# Patient Record
Sex: FEMALE | Race: WHITE | Employment: OTHER | URBAN - METROPOLITAN AREA
[De-identification: names, ages, dates, MRNs, and addresses within clinical notes are randomized per-mention and may not be internally consistent; named-entity substitution may affect disease eponyms.]

---

## 2017-02-24 ENCOUNTER — OFFICE VISIT (OUTPATIENT)
Dept: CARDIOLOGY CLINIC | Age: 52
End: 2017-02-24

## 2017-02-24 VITALS
WEIGHT: 138 LBS | HEART RATE: 78 BPM | HEIGHT: 58 IN | SYSTOLIC BLOOD PRESSURE: 120 MMHG | BODY MASS INDEX: 28.97 KG/M2 | DIASTOLIC BLOOD PRESSURE: 60 MMHG

## 2017-02-24 DIAGNOSIS — I25.10 CORONARY ARTERY DISEASE INVOLVING NATIVE CORONARY ARTERY OF NATIVE HEART WITHOUT ANGINA PECTORIS: Primary | ICD-10-CM

## 2017-02-24 DIAGNOSIS — J44.9 CHRONIC OBSTRUCTIVE PULMONARY DISEASE, UNSPECIFIED COPD TYPE (HCC): ICD-10-CM

## 2017-02-24 DIAGNOSIS — K21.9 GASTROESOPHAGEAL REFLUX DISEASE WITHOUT ESOPHAGITIS: ICD-10-CM

## 2017-02-24 DIAGNOSIS — R07.9 CHEST PAIN, UNSPECIFIED TYPE: ICD-10-CM

## 2017-02-24 DIAGNOSIS — E78.5 HYPERLIPIDEMIA, UNSPECIFIED HYPERLIPIDEMIA TYPE: ICD-10-CM

## 2017-02-24 PROCEDURE — 93000 ELECTROCARDIOGRAM COMPLETE: CPT | Performed by: INTERNAL MEDICINE

## 2017-02-24 PROCEDURE — 99214 OFFICE O/P EST MOD 30 MIN: CPT | Performed by: INTERNAL MEDICINE

## 2017-02-24 RX ORDER — LISINOPRIL 20 MG/1
20 TABLET ORAL DAILY
COMMUNITY
End: 2017-04-03 | Stop reason: ALTCHOICE

## 2017-02-24 RX ORDER — ISOSORBIDE MONONITRATE 30 MG/1
30 TABLET, EXTENDED RELEASE ORAL DAILY
COMMUNITY
End: 2017-11-08

## 2017-02-24 RX ORDER — ASPIRIN 81 MG/1
81 TABLET, CHEWABLE ORAL DAILY
Qty: 30 TABLET | Refills: 100
Start: 2017-02-24 | End: 2017-03-14 | Stop reason: SDUPTHER

## 2017-03-14 RX ORDER — ASPIRIN 81 MG/1
81 TABLET, CHEWABLE ORAL DAILY
Qty: 30 TABLET | Refills: 11 | Status: SHIPPED | OUTPATIENT
Start: 2017-03-14

## 2017-04-03 ENCOUNTER — OFFICE VISIT (OUTPATIENT)
Dept: PULMONOLOGY | Age: 52
End: 2017-04-03

## 2017-04-03 VITALS
HEART RATE: 71 BPM | SYSTOLIC BLOOD PRESSURE: 133 MMHG | DIASTOLIC BLOOD PRESSURE: 67 MMHG | OXYGEN SATURATION: 96 % | WEIGHT: 138 LBS | BODY MASS INDEX: 28.84 KG/M2

## 2017-04-03 DIAGNOSIS — J44.9 COPD, SEVERE (HCC): ICD-10-CM

## 2017-04-03 DIAGNOSIS — R06.02 SOB (SHORTNESS OF BREATH): ICD-10-CM

## 2017-04-03 PROCEDURE — 99214 OFFICE O/P EST MOD 30 MIN: CPT | Performed by: INTERNAL MEDICINE

## 2017-04-03 RX ORDER — FENOFIBRATE 40 MG/1
40 TABLET ORAL DAILY
COMMUNITY
End: 2018-05-16

## 2017-04-03 RX ORDER — MONTELUKAST SODIUM 10 MG/1
10 TABLET ORAL NIGHTLY
COMMUNITY
End: 2017-11-06 | Stop reason: ALTCHOICE

## 2017-04-03 RX ORDER — ALBUTEROL SULFATE 90 UG/1
2 AEROSOL, METERED RESPIRATORY (INHALATION) EVERY 6 HOURS PRN
COMMUNITY
End: 2017-04-17 | Stop reason: SDUPTHER

## 2017-04-03 RX ORDER — LORATADINE 10 MG/1
10 TABLET ORAL DAILY
COMMUNITY
End: 2017-07-07

## 2017-04-03 RX ORDER — ALBUTEROL SULFATE 2.5 MG/3ML
2.5 SOLUTION RESPIRATORY (INHALATION) EVERY 6 HOURS PRN
COMMUNITY
End: 2017-04-03 | Stop reason: SDUPTHER

## 2017-04-03 RX ORDER — ALBUTEROL SULFATE 2.5 MG/3ML
2.5 SOLUTION RESPIRATORY (INHALATION) EVERY 6 HOURS PRN
Qty: 120 EACH | Refills: 6 | Status: SHIPPED | OUTPATIENT
Start: 2017-04-03 | End: 2018-11-14 | Stop reason: SDUPTHER

## 2017-04-03 ASSESSMENT — ENCOUNTER SYMPTOMS
SINUS PRESSURE: 0
DIARRHEA: 0
NAUSEA: 0
CONSTIPATION: 0
ABDOMINAL PAIN: 0

## 2017-04-06 ENCOUNTER — HOSPITAL ENCOUNTER (OUTPATIENT)
Dept: PULMONOLOGY | Age: 52
Discharge: HOME | End: 2017-04-06
Attending: INTERNAL MEDICINE | Admitting: INTERNAL MEDICINE

## 2017-04-06 ENCOUNTER — HOSPITAL ENCOUNTER (OUTPATIENT)
Dept: OTHER | Age: 52
Discharge: HOME | End: 2017-04-06
Attending: INTERNAL MEDICINE | Admitting: INTERNAL MEDICINE

## 2017-04-06 VITALS — HEART RATE: 70 BPM | OXYGEN SATURATION: 95 %

## 2017-04-06 DIAGNOSIS — R06.02 SHORTNESS OF BREATH: ICD-10-CM

## 2017-04-06 DIAGNOSIS — R06.02 SOB (SHORTNESS OF BREATH): ICD-10-CM

## 2017-04-06 RX ORDER — ALBUTEROL SULFATE 90 UG/1
4 AEROSOL, METERED RESPIRATORY (INHALATION) ONCE
Status: COMPLETED | OUTPATIENT
Start: 2017-04-06 | End: 2017-04-06

## 2017-04-06 RX ADMIN — ALBUTEROL SULFATE 4 PUFF: 90 AEROSOL, METERED RESPIRATORY (INHALATION) at 12:30

## 2017-04-17 RX ORDER — ALBUTEROL SULFATE 90 UG/1
2 AEROSOL, METERED RESPIRATORY (INHALATION) EVERY 6 HOURS PRN
Qty: 1 INHALER | Refills: 11 | Status: SHIPPED | OUTPATIENT
Start: 2017-04-17 | End: 2018-11-14 | Stop reason: SDUPTHER

## 2017-05-03 ENCOUNTER — OFFICE VISIT (OUTPATIENT)
Dept: PULMONOLOGY | Age: 52
End: 2017-05-03

## 2017-05-03 VITALS
WEIGHT: 136 LBS | SYSTOLIC BLOOD PRESSURE: 118 MMHG | BODY MASS INDEX: 28.42 KG/M2 | HEART RATE: 69 BPM | DIASTOLIC BLOOD PRESSURE: 62 MMHG

## 2017-05-03 DIAGNOSIS — R06.02 SOB (SHORTNESS OF BREATH): Primary | ICD-10-CM

## 2017-05-03 DIAGNOSIS — Z23 NEED FOR PNEUMOCOCCAL VACCINATION: ICD-10-CM

## 2017-05-03 DIAGNOSIS — J44.9 COPD, SEVERE (HCC): ICD-10-CM

## 2017-05-03 DIAGNOSIS — J45.40 MODERATE PERSISTENT ASTHMA WITHOUT COMPLICATION: ICD-10-CM

## 2017-05-03 DIAGNOSIS — I25.10 CORONARY ARTERY DISEASE INVOLVING NATIVE CORONARY ARTERY OF NATIVE HEART WITHOUT ANGINA PECTORIS: ICD-10-CM

## 2017-05-03 PROCEDURE — 99214 OFFICE O/P EST MOD 30 MIN: CPT | Performed by: INTERNAL MEDICINE

## 2017-05-03 PROCEDURE — G0009 ADMIN PNEUMOCOCCAL VACCINE: HCPCS | Performed by: INTERNAL MEDICINE

## 2017-05-03 PROCEDURE — 90670 PCV13 VACCINE IM: CPT | Performed by: INTERNAL MEDICINE

## 2017-05-23 ENCOUNTER — HOSPITAL ENCOUNTER (OUTPATIENT)
Dept: ENDOSCOPY | Age: 52
Discharge: HOME | End: 2017-05-23
Attending: INTERNAL MEDICINE | Admitting: INTERNAL MEDICINE

## 2017-05-23 RX ORDER — SODIUM CHLORIDE 0.9 % (FLUSH) 0.9 %
10 SYRINGE (ML) INJECTION PRN
Status: DISCONTINUED | OUTPATIENT
Start: 2017-05-23 | End: 2017-05-24 | Stop reason: HOSPADM

## 2017-05-23 RX ORDER — LIDOCAINE HYDROCHLORIDE 10 MG/ML
1 INJECTION, SOLUTION EPIDURAL; INFILTRATION; INTRACAUDAL; PERINEURAL
Status: DISPENSED | OUTPATIENT
Start: 2017-05-23 | End: 2017-05-23

## 2017-05-23 RX ORDER — SODIUM CHLORIDE 9 MG/ML
INJECTION, SOLUTION INTRAVENOUS CONTINUOUS
Status: DISCONTINUED | OUTPATIENT
Start: 2017-05-23 | End: 2017-05-24 | Stop reason: HOSPADM

## 2017-05-23 RX ORDER — SODIUM CHLORIDE 0.9 % (FLUSH) 0.9 %
10 SYRINGE (ML) INJECTION EVERY 12 HOURS SCHEDULED
Status: DISCONTINUED | OUTPATIENT
Start: 2017-05-23 | End: 2017-05-24 | Stop reason: HOSPADM

## 2017-05-23 ASSESSMENT — COPD QUESTIONNAIRES: CAT_SEVERITY: MODERATE

## 2017-05-23 ASSESSMENT — ENCOUNTER SYMPTOMS: SHORTNESS OF BREATH: 1

## 2017-06-02 ENCOUNTER — HOSPITAL ENCOUNTER (OUTPATIENT)
Dept: NUCLEAR MEDICINE | Age: 52
Discharge: HOME | End: 2017-06-06
Attending: INTERNAL MEDICINE | Admitting: INTERNAL MEDICINE

## 2017-06-02 DIAGNOSIS — R11.0 NAUSEA: ICD-10-CM

## 2017-06-02 RX ADMIN — Medication 700 MICRO CURIE: at 08:12

## 2017-07-07 ENCOUNTER — OFFICE VISIT (OUTPATIENT)
Dept: CARDIOLOGY CLINIC | Age: 52
End: 2017-07-07

## 2017-07-07 VITALS
WEIGHT: 119 LBS | HEIGHT: 58 IN | SYSTOLIC BLOOD PRESSURE: 122 MMHG | DIASTOLIC BLOOD PRESSURE: 70 MMHG | BODY MASS INDEX: 24.98 KG/M2 | HEART RATE: 68 BPM

## 2017-07-07 DIAGNOSIS — I25.10 CORONARY ARTERY DISEASE INVOLVING NATIVE CORONARY ARTERY OF NATIVE HEART WITHOUT ANGINA PECTORIS: Primary | ICD-10-CM

## 2017-07-07 DIAGNOSIS — I10 ESSENTIAL HYPERTENSION: ICD-10-CM

## 2017-07-07 DIAGNOSIS — E78.5 HYPERLIPIDEMIA, UNSPECIFIED HYPERLIPIDEMIA TYPE: ICD-10-CM

## 2017-07-07 PROCEDURE — 3014F SCREEN MAMMO DOC REV: CPT | Performed by: INTERNAL MEDICINE

## 2017-07-07 PROCEDURE — 99214 OFFICE O/P EST MOD 30 MIN: CPT | Performed by: INTERNAL MEDICINE

## 2017-07-07 PROCEDURE — 3017F COLORECTAL CA SCREEN DOC REV: CPT | Performed by: INTERNAL MEDICINE

## 2017-07-07 PROCEDURE — G8420 CALC BMI NORM PARAMETERS: HCPCS | Performed by: INTERNAL MEDICINE

## 2017-07-07 PROCEDURE — G8598 ASA/ANTIPLAT THER USED: HCPCS | Performed by: INTERNAL MEDICINE

## 2017-07-07 PROCEDURE — 1036F TOBACCO NON-USER: CPT | Performed by: INTERNAL MEDICINE

## 2017-07-07 PROCEDURE — G8428 CUR MEDS NOT DOCUMENT: HCPCS | Performed by: INTERNAL MEDICINE

## 2017-07-07 RX ORDER — METOCLOPRAMIDE 10 MG/1
10 TABLET ORAL 4 TIMES DAILY
Qty: 120 TABLET | Refills: 3 | Status: SHIPPED | OUTPATIENT
Start: 2017-07-07 | End: 2017-11-08

## 2017-07-07 RX ORDER — OMEPRAZOLE 20 MG/1
20 CAPSULE, DELAYED RELEASE ORAL DAILY
COMMUNITY
End: 2017-07-07

## 2017-09-29 RX ORDER — CLOPIDOGREL BISULFATE 75 MG/1
TABLET ORAL
Qty: 30 TABLET | Refills: 11 | Status: SHIPPED | OUTPATIENT
Start: 2017-09-29 | End: 2018-09-18 | Stop reason: SDUPTHER

## 2017-10-20 ENCOUNTER — HOSPITAL ENCOUNTER (OUTPATIENT)
Dept: ULTRASOUND IMAGING | Age: 52
Discharge: HOME | End: 2017-10-20
Admitting: INTERNAL MEDICINE

## 2017-10-20 DIAGNOSIS — C80.1: ICD-10-CM

## 2017-10-20 DIAGNOSIS — C18.2 MALIGNANT NEOPLASM OF ASCENDING COLON (HCC): ICD-10-CM

## 2017-11-01 RX ORDER — ATORVASTATIN CALCIUM 40 MG/1
40 TABLET, FILM COATED ORAL DAILY
Qty: 30 TABLET | Refills: 11 | Status: SHIPPED | OUTPATIENT
Start: 2017-11-01 | End: 2018-11-23 | Stop reason: SDUPTHER

## 2017-11-06 ENCOUNTER — OFFICE VISIT (OUTPATIENT)
Dept: PULMONOLOGY | Age: 52
End: 2017-11-06

## 2017-11-06 VITALS
HEART RATE: 58 BPM | WEIGHT: 108 LBS | SYSTOLIC BLOOD PRESSURE: 104 MMHG | BODY MASS INDEX: 22.57 KG/M2 | DIASTOLIC BLOOD PRESSURE: 61 MMHG

## 2017-11-06 DIAGNOSIS — J45.40 MODERATE PERSISTENT ASTHMA WITHOUT COMPLICATION: ICD-10-CM

## 2017-11-06 DIAGNOSIS — J44.9 COPD, SEVERE (HCC): ICD-10-CM

## 2017-11-06 DIAGNOSIS — I10 ESSENTIAL HYPERTENSION: ICD-10-CM

## 2017-11-06 DIAGNOSIS — R06.02 SOB (SHORTNESS OF BREATH): Primary | ICD-10-CM

## 2017-11-06 PROCEDURE — G8427 DOCREV CUR MEDS BY ELIG CLIN: HCPCS | Performed by: INTERNAL MEDICINE

## 2017-11-06 PROCEDURE — 1036F TOBACCO NON-USER: CPT | Performed by: INTERNAL MEDICINE

## 2017-11-06 PROCEDURE — 3023F SPIROM DOC REV: CPT | Performed by: INTERNAL MEDICINE

## 2017-11-06 PROCEDURE — 3017F COLORECTAL CA SCREEN DOC REV: CPT | Performed by: INTERNAL MEDICINE

## 2017-11-06 PROCEDURE — G8484 FLU IMMUNIZE NO ADMIN: HCPCS | Performed by: INTERNAL MEDICINE

## 2017-11-06 PROCEDURE — 3014F SCREEN MAMMO DOC REV: CPT | Performed by: INTERNAL MEDICINE

## 2017-11-06 PROCEDURE — G8420 CALC BMI NORM PARAMETERS: HCPCS | Performed by: INTERNAL MEDICINE

## 2017-11-06 PROCEDURE — G8598 ASA/ANTIPLAT THER USED: HCPCS | Performed by: INTERNAL MEDICINE

## 2017-11-06 PROCEDURE — 99214 OFFICE O/P EST MOD 30 MIN: CPT | Performed by: INTERNAL MEDICINE

## 2017-11-06 PROCEDURE — G8926 SPIRO NO PERF OR DOC: HCPCS | Performed by: INTERNAL MEDICINE

## 2017-11-06 PROCEDURE — G0008 ADMIN INFLUENZA VIRUS VAC: HCPCS | Performed by: INTERNAL MEDICINE

## 2017-11-06 PROCEDURE — 90686 IIV4 VACC NO PRSV 0.5 ML IM: CPT | Performed by: INTERNAL MEDICINE

## 2017-11-06 NOTE — PROGRESS NOTES
Tori Aaron, MARYJO       Vitals:    11/06/17 1338   BP: 104/61   Pulse: 58   Weight: 108 lb (49 kg)     Body mass index is 22.57 kg/m².      Wt Readings from Last 3 Encounters:   11/06/17 108 lb (49 kg)   07/07/17 119 lb (54 kg)   05/17/17 137 lb (62.1 kg)     BP Readings from Last 3 Encounters:   11/06/17 104/61   07/07/17 122/70   05/03/17 118/62        History   Smoking Status    Former Smoker    Years: 35.00    Types: Cigarettes    Quit date: 8/16/2009   Smokeless Tobacco    Never Used     Comment: H.O.smoking 1.5 ppd x 35 yrs / Quit 8/6/2009

## 2017-11-08 ENCOUNTER — OFFICE VISIT (OUTPATIENT)
Dept: CARDIOLOGY CLINIC | Age: 52
End: 2017-11-08

## 2017-11-08 VITALS
HEIGHT: 58 IN | BODY MASS INDEX: 22.88 KG/M2 | HEART RATE: 76 BPM | SYSTOLIC BLOOD PRESSURE: 110 MMHG | WEIGHT: 109 LBS | DIASTOLIC BLOOD PRESSURE: 54 MMHG

## 2017-11-08 DIAGNOSIS — E78.5 HYPERLIPIDEMIA, UNSPECIFIED HYPERLIPIDEMIA TYPE: ICD-10-CM

## 2017-11-08 DIAGNOSIS — I10 ESSENTIAL HYPERTENSION: ICD-10-CM

## 2017-11-08 DIAGNOSIS — Z72.0 TOBACCO ABUSE: ICD-10-CM

## 2017-11-08 DIAGNOSIS — I25.10 CORONARY ARTERY DISEASE INVOLVING NATIVE CORONARY ARTERY OF NATIVE HEART WITHOUT ANGINA PECTORIS: Primary | ICD-10-CM

## 2017-11-08 PROCEDURE — G8427 DOCREV CUR MEDS BY ELIG CLIN: HCPCS | Performed by: INTERNAL MEDICINE

## 2017-11-08 PROCEDURE — G8484 FLU IMMUNIZE NO ADMIN: HCPCS | Performed by: INTERNAL MEDICINE

## 2017-11-08 PROCEDURE — 3017F COLORECTAL CA SCREEN DOC REV: CPT | Performed by: INTERNAL MEDICINE

## 2017-11-08 PROCEDURE — G8598 ASA/ANTIPLAT THER USED: HCPCS | Performed by: INTERNAL MEDICINE

## 2017-11-08 PROCEDURE — 99214 OFFICE O/P EST MOD 30 MIN: CPT | Performed by: INTERNAL MEDICINE

## 2017-11-08 PROCEDURE — 3014F SCREEN MAMMO DOC REV: CPT | Performed by: INTERNAL MEDICINE

## 2017-11-08 PROCEDURE — G8420 CALC BMI NORM PARAMETERS: HCPCS | Performed by: INTERNAL MEDICINE

## 2017-11-08 PROCEDURE — 1036F TOBACCO NON-USER: CPT | Performed by: INTERNAL MEDICINE

## 2017-11-08 RX ORDER — NITROGLYCERIN 0.4 MG/1
0.4 TABLET SUBLINGUAL EVERY 5 MIN PRN
Qty: 25 TABLET | Refills: 1 | Status: SHIPPED | OUTPATIENT
Start: 2017-11-08 | End: 2019-01-31 | Stop reason: SDUPTHER

## 2017-11-08 NOTE — LETTER
[facility]  [address] [place] [address]  Phone: [phone]  Fax: [phone]    Dani Mclaughlin MD        2017     Chapis Edwards DO  [address] [place] [address]    Patient: Aquiles Haney  MR Number: [id number]  YOB: 1965  Date of Visit: 2017    Dear Dr. Chapis Edwards: Thank you for the request for consultation for Amaury Torres to me for the evaluation of Ms Ashley Kowalski. Below are the relevant portions of my assessment and plan of care. [address]   Cardiac Evaluation      Patient: Aquiles Haney  YOB: 1965  Date: 17       Chief Complaint   Patient presents with    Coronary Artery Disease    Hyperlipidemia    Hypertension        Referring provider: Melany Chris DO    History of Present Illness:   Ms. Ashley Kowalski comes to the office today in follow up. Sonja Ocampo has coronary disease with Xience stent to Cx 10/25/12. Other history includes asthma, COPD, hyperlipidemia. She quit smoking 6 years ago. She also has a history of colon cancer and reflux. Today, Sonja Ocampo states she has been diagnosed with osteopenia. She is walking for exercise, 30 minutes regularly. She denies any chest pain, palpitations, dizziness, or edema. She has smoked \"a few\" cigarettes lately. She started back to smoking in August.    Past Medical History:   has a past medical history of Asthma; CAD (coronary artery disease); Colon cancer ([address] COPD (chronic obstructive pulmonary disease) ([facility] [address] Emphysema; Essential hypertension; GERD (gastroesophageal reflux disease); Heart attack; Other and unspecified hyperlipidemia; and Pneumonia. Surgical History:   has a past surgical history that includes colectomy;  section; Appendectomy; Tubal ligation; Colonoscopy; and eye muscle surgery.  Recent alan    Social History:  History     Social History    Marital Status:      Spouse Name: N/A

## 2017-11-08 NOTE — PROGRESS NOTES
[address]   Cardiac Evaluation      Patient: Chrissie Jo  YOB: 1965  Date: 17       Chief Complaint   Patient presents with    Coronary Artery Disease    Hyperlipidemia    Hypertension        Referring provider: Alphonso Chris DO    History of Present Illness:   Ms. Hubert Rodriguez comes to the office today in follow up. Rayna Gandhi has coronary disease with Xience stent to Cx 10/25/12. Other history includes asthma, COPD, hyperlipidemia. She quit smoking 6 years ago. She also has a history of colon cancer and reflux. Today, Ranya Gandhi states she has been diagnosed with osteopenia. She is walking for exercise, 30 minutes regularly. She denies any chest pain, palpitations, dizziness, or edema. She has smoked \"a few\" cigarettes lately. She started back to smoking in August.    Past Medical History:   has a past medical history of Asthma; CAD (coronary artery disease); Colon cancer ([facility] [address] COPD (chronic obstructive pulmonary disease) ([facility] [address] Emphysema; Essential hypertension; GERD (gastroesophageal reflux disease); Heart attack; Other and unspecified hyperlipidemia; and Pneumonia. Surgical History:   has a past surgical history that includes colectomy;  section; Appendectomy; Tubal ligation; Colonoscopy; and eye muscle surgery. Recent alan    Social History:  History     Social History    Marital Status:      Spouse Name: N/A     Number of Children: N/A    Years of Education: N/A     Occupational History    Not on file.      Social History Main Topics    Smoking status: Former Smoker -- 28 years      Types: Cigarettes     Quit date: 2009 Six years ago!!!    Smokeless tobacco: Never Used      Comment: H.O.smoking 1.5 ppd x 35 yrs / Quit 2009    Alcohol Use: Yes      Comment: occ    Drug Use: No    Sexual Activity: Not on file     Other Topics Concern    Not on file     Social History Narrative       Family History:  family history includes Cancer in her brother, father, and mother; Diabetes in her father; Heart Disease in her mother and sister; Thyroid Disease in her sister. Her sister is Jasmyn Jules. Allergies:  Lisinopril and Phenergan [promethazine hcl]     Review of Systems:   · Constitutional: there has been no unanticipated weight loss. No change in energy or activity level   · Eyes: No visual changes   · ENT: No Headaches, hearing loss or vertigo. No mouth sores or sore throat. · Cardiovascular: Reviewed in HPI  · Respiratory: No cough or wheezing, no sputum production. · Gastrointestinal: No abdominal pain, appetite loss, blood in stools. No change in bowel or bladder habits. · Genitourinary: No nocturia, dysuria, trouble voiding  · Musculoskeletal:  No gait disturbance, weakness or joint complaints. · Integumentary: No rash or pruritis. · Neurological: No headache, change in muscle strength, numbness or tingling. States she has been dizzy  · Psychiatric: No anxiety or depression  · Endocrine: No malaise or fever  · Hematologic/Lymphatic: No abnormal bruising or bleeding, blood clots or swollen lymph nodes. · Allergic/Immunologic: No nasal congestion or hives. Physical Examination:    Vitals:    11/08/17 1515   BP: (!) 110/54   Site: Right Arm   Position: Sitting   Cuff Size: Medium Adult   Pulse: 76   Weight: 109 lb (49.4 kg)   Height: 4' 10\" (1.473 m)     Body mass index is 22.78 kg/m².      Wt Readings from Last 3 Encounters:   11/08/17 109 lb (49.4 kg)   11/06/17 108 lb (49 kg)   07/07/17 119 lb (54 kg)      BP Readings from Last 3 Encounters:   11/08/17 (!) 110/54   11/06/17 104/61   07/07/17 122/70      Constitutional and General Appearance:  appears stated age  Respiratory:  · Normal excursion and expansion without use of accessory muscles  · Resp Auscultation: normal breath sounds  Cardiovascular:  · The apical impulses not displaced  · Heart is regular rate and rhythm with normal S1, S2  · The carotid upstroke is normal, no bruit

## 2017-11-09 NOTE — COMMUNICATION BODY
[facility]   Cardiac Evaluation      Patient: Phoebe Henderson  YOB: 1965  Date: 17       Chief Complaint   Patient presents with    Coronary Artery Disease    Hyperlipidemia    Hypertension        Referring provider: Nimo Chris DO    History of Present Illness:   Ms. Elvira Garcia comes to the office today in follow up. Lukasz Lambert has coronary disease with Xience stent to Cx 10/25/12. Other history includes asthma, COPD, hyperlipidemia. She quit smoking 6 years ago. She also has a history of colon cancer and reflux. Today, Lukasz Lambert states she has been diagnosed with osteopenia. She is walking for exercise, 30 minutes regularly. She denies any chest pain, palpitations, dizziness, or edema. She has smoked \"a few\" cigarettes lately. She started back to smoking in August.    Past Medical History:   has a past medical history of Asthma; CAD (coronary artery disease); Colon cancer ([address] COPD (chronic obstructive pulmonary disease) ([facility] [address] Emphysema; Essential hypertension; GERD (gastroesophageal reflux disease); Heart attack; Other and unspecified hyperlipidemia; and Pneumonia. Surgical History:   has a past surgical history that includes colectomy;  section; Appendectomy; Tubal ligation; Colonoscopy; and eye muscle surgery. Recent alan    Social History:  History     Social History    Marital Status:      Spouse Name: N/A     Number of Children: N/A    Years of Education: N/A     Occupational History    Not on file.      Social History Main Topics    Smoking status: Former Smoker -- 28 years      Types: Cigarettes     Quit date: 2009 Six years ago!!!    Smokeless tobacco: Never Used      Comment: H.O.smoking 1.5 ppd x 35 yrs / Quit 2009    Alcohol Use: Yes      Comment: occ    Drug Use: No    Sexual Activity: Not on file     Other Topics Concern    Not on file     Social History Narrative       Family History:  family history includes Cancer in her brother, father, and mother; Diabetes in her father; Heart Disease in her mother and sister; Thyroid Disease in her sister. Her sister is Nela Michelle. Allergies:  Lisinopril and Phenergan [promethazine hcl]     Review of Systems:   · Constitutional: there has been no unanticipated weight loss. No change in energy or activity level   · Eyes: No visual changes   · ENT: No Headaches, hearing loss or vertigo. No mouth sores or sore throat. · Cardiovascular: Reviewed in HPI  · Respiratory: No cough or wheezing, no sputum production. · Gastrointestinal: No abdominal pain, appetite loss, blood in stools. No change in bowel or bladder habits. · Genitourinary: No nocturia, dysuria, trouble voiding  · Musculoskeletal:  No gait disturbance, weakness or joint complaints. · Integumentary: No rash or pruritis. · Neurological: No headache, change in muscle strength, numbness or tingling. States she has been dizzy  · Psychiatric: No anxiety or depression  · Endocrine: No malaise or fever  · Hematologic/Lymphatic: No abnormal bruising or bleeding, blood clots or swollen lymph nodes. · Allergic/Immunologic: No nasal congestion or hives. Physical Examination:    Vitals:    11/08/17 1515   BP: (!) 110/54   Site: Right Arm   Position: Sitting   Cuff Size: Medium Adult   Pulse: 76   Weight: 109 lb (49.4 kg)   Height: 4' 10\" (1.473 m)     Body mass index is 22.78 kg/m².      Wt Readings from Last 3 Encounters:   11/08/17 109 lb (49.4 kg)   11/06/17 108 lb (49 kg)   07/07/17 119 lb (54 kg)      BP Readings from Last 3 Encounters:   11/08/17 (!) 110/54   11/06/17 104/61   07/07/17 122/70      Constitutional and General Appearance:  appears stated age  Respiratory:  · Normal excursion and expansion without use of accessory muscles  · Resp Auscultation: normal breath sounds  Cardiovascular:  · The apical impulses not displaced  · Heart is regular rate and rhythm with normal S1, S2  · The carotid upstroke is normal, no bruit

## 2018-01-24 ENCOUNTER — TELEPHONE (OUTPATIENT)
Dept: PULMONOLOGY | Age: 53
End: 2018-01-24

## 2018-01-25 NOTE — TELEPHONE ENCOUNTER
Called pharmacy and spoke with Pharmacist. Domingo Boucher is on formulary so called this in for pt per Dr Case Jimenez

## 2018-03-12 ENCOUNTER — TELEPHONE (OUTPATIENT)
Dept: PULMONOLOGY | Age: 53
End: 2018-03-12

## 2018-04-18 ENCOUNTER — TELEPHONE (OUTPATIENT)
Dept: CARDIOLOGY CLINIC | Age: 53
End: 2018-04-18

## 2018-04-23 ENCOUNTER — NURSE ONLY (OUTPATIENT)
Dept: CARDIOLOGY CLINIC | Age: 53
End: 2018-04-23

## 2018-04-23 DIAGNOSIS — I25.10 CORONARY ARTERY DISEASE INVOLVING NATIVE CORONARY ARTERY OF NATIVE HEART WITHOUT ANGINA PECTORIS: Primary | ICD-10-CM

## 2018-04-23 PROCEDURE — 93000 ELECTROCARDIOGRAM COMPLETE: CPT | Performed by: INTERNAL MEDICINE

## 2018-05-14 ENCOUNTER — OFFICE VISIT (OUTPATIENT)
Dept: PULMONOLOGY | Age: 53
End: 2018-05-14

## 2018-05-14 VITALS
WEIGHT: 74 LBS | HEART RATE: 59 BPM | BODY MASS INDEX: 15.47 KG/M2 | OXYGEN SATURATION: 95 % | DIASTOLIC BLOOD PRESSURE: 64 MMHG | SYSTOLIC BLOOD PRESSURE: 109 MMHG

## 2018-05-14 DIAGNOSIS — R06.02 SOB (SHORTNESS OF BREATH): Primary | ICD-10-CM

## 2018-05-14 DIAGNOSIS — J45.40 MODERATE PERSISTENT ASTHMA WITHOUT COMPLICATION: ICD-10-CM

## 2018-05-14 DIAGNOSIS — J44.9 COPD, SEVERE (HCC): ICD-10-CM

## 2018-05-14 DIAGNOSIS — I25.10 CORONARY ARTERY DISEASE INVOLVING NATIVE CORONARY ARTERY OF NATIVE HEART WITHOUT ANGINA PECTORIS: ICD-10-CM

## 2018-05-14 PROCEDURE — 3023F SPIROM DOC REV: CPT | Performed by: INTERNAL MEDICINE

## 2018-05-14 PROCEDURE — 3017F COLORECTAL CA SCREEN DOC REV: CPT | Performed by: INTERNAL MEDICINE

## 2018-05-14 PROCEDURE — G8427 DOCREV CUR MEDS BY ELIG CLIN: HCPCS | Performed by: INTERNAL MEDICINE

## 2018-05-14 PROCEDURE — G8598 ASA/ANTIPLAT THER USED: HCPCS | Performed by: INTERNAL MEDICINE

## 2018-05-14 PROCEDURE — G8926 SPIRO NO PERF OR DOC: HCPCS | Performed by: INTERNAL MEDICINE

## 2018-05-14 PROCEDURE — G8419 CALC BMI OUT NRM PARAM NOF/U: HCPCS | Performed by: INTERNAL MEDICINE

## 2018-05-14 PROCEDURE — 99214 OFFICE O/P EST MOD 30 MIN: CPT | Performed by: INTERNAL MEDICINE

## 2018-05-14 PROCEDURE — 1036F TOBACCO NON-USER: CPT | Performed by: INTERNAL MEDICINE

## 2018-05-16 ENCOUNTER — OFFICE VISIT (OUTPATIENT)
Dept: CARDIOLOGY CLINIC | Age: 53
End: 2018-05-16

## 2018-05-16 VITALS
HEART RATE: 68 BPM | DIASTOLIC BLOOD PRESSURE: 60 MMHG | BODY MASS INDEX: 20.36 KG/M2 | WEIGHT: 97 LBS | HEIGHT: 58 IN | SYSTOLIC BLOOD PRESSURE: 124 MMHG

## 2018-05-16 DIAGNOSIS — I25.10 CORONARY ARTERY DISEASE INVOLVING NATIVE CORONARY ARTERY OF NATIVE HEART WITHOUT ANGINA PECTORIS: Primary | ICD-10-CM

## 2018-05-16 DIAGNOSIS — E78.5 HYPERLIPIDEMIA, UNSPECIFIED HYPERLIPIDEMIA TYPE: ICD-10-CM

## 2018-05-16 DIAGNOSIS — I10 ESSENTIAL HYPERTENSION: ICD-10-CM

## 2018-05-16 DIAGNOSIS — Z72.0 TOBACCO ABUSE: ICD-10-CM

## 2018-05-16 PROCEDURE — 3017F COLORECTAL CA SCREEN DOC REV: CPT | Performed by: INTERNAL MEDICINE

## 2018-05-16 PROCEDURE — G8598 ASA/ANTIPLAT THER USED: HCPCS | Performed by: INTERNAL MEDICINE

## 2018-05-16 PROCEDURE — 4004F PT TOBACCO SCREEN RCVD TLK: CPT | Performed by: INTERNAL MEDICINE

## 2018-05-16 PROCEDURE — G8427 DOCREV CUR MEDS BY ELIG CLIN: HCPCS | Performed by: INTERNAL MEDICINE

## 2018-05-16 PROCEDURE — 99214 OFFICE O/P EST MOD 30 MIN: CPT | Performed by: INTERNAL MEDICINE

## 2018-05-16 PROCEDURE — G8420 CALC BMI NORM PARAMETERS: HCPCS | Performed by: INTERNAL MEDICINE

## 2018-05-16 RX ORDER — PANTOPRAZOLE SODIUM 40 MG/1
40 TABLET, DELAYED RELEASE ORAL DAILY
COMMUNITY
End: 2021-04-08 | Stop reason: ALTCHOICE

## 2018-07-19 ENCOUNTER — TELEPHONE (OUTPATIENT)
Dept: PULMONOLOGY | Age: 53
End: 2018-07-19

## 2018-07-19 NOTE — TELEPHONE ENCOUNTER
Per last ov she was to use Spiriva. I left a message for her to see which form of Spiriva she is currently using.

## 2018-09-20 RX ORDER — CLOPIDOGREL BISULFATE 75 MG/1
TABLET ORAL
Qty: 30 TABLET | Refills: 11 | Status: SHIPPED | OUTPATIENT
Start: 2018-09-20 | End: 2019-01-31 | Stop reason: SDUPTHER

## 2018-10-19 ENCOUNTER — TELEPHONE (OUTPATIENT)
Dept: PULMONOLOGY | Age: 53
End: 2018-10-19

## 2018-10-19 NOTE — TELEPHONE ENCOUNTER
Pt called she needs to know how to get tubing and mouth piece for her nebulizer. She said there no information on the machine.   [phone]

## 2018-11-14 ENCOUNTER — OFFICE VISIT (OUTPATIENT)
Dept: PULMONOLOGY | Age: 53
End: 2018-11-14
Payer: MEDICARE

## 2018-11-14 VITALS
BODY MASS INDEX: 19.23 KG/M2 | WEIGHT: 92 LBS | SYSTOLIC BLOOD PRESSURE: 130 MMHG | DIASTOLIC BLOOD PRESSURE: 50 MMHG | HEART RATE: 69 BPM | OXYGEN SATURATION: 92 %

## 2018-11-14 DIAGNOSIS — R06.02 SOB (SHORTNESS OF BREATH): Primary | ICD-10-CM

## 2018-11-14 DIAGNOSIS — J45.40 MODERATE PERSISTENT ASTHMA WITHOUT COMPLICATION: ICD-10-CM

## 2018-11-14 DIAGNOSIS — Z72.0 TOBACCO ABUSE: ICD-10-CM

## 2018-11-14 DIAGNOSIS — Z23 NEED FOR PNEUMOCOCCAL VACCINATION: ICD-10-CM

## 2018-11-14 DIAGNOSIS — R05.9 COUGH: ICD-10-CM

## 2018-11-14 PROCEDURE — G8420 CALC BMI NORM PARAMETERS: HCPCS | Performed by: INTERNAL MEDICINE

## 2018-11-14 PROCEDURE — 99214 OFFICE O/P EST MOD 30 MIN: CPT | Performed by: INTERNAL MEDICINE

## 2018-11-14 PROCEDURE — 3017F COLORECTAL CA SCREEN DOC REV: CPT | Performed by: INTERNAL MEDICINE

## 2018-11-14 PROCEDURE — G8427 DOCREV CUR MEDS BY ELIG CLIN: HCPCS | Performed by: INTERNAL MEDICINE

## 2018-11-14 PROCEDURE — G8484 FLU IMMUNIZE NO ADMIN: HCPCS | Performed by: INTERNAL MEDICINE

## 2018-11-14 PROCEDURE — G8598 ASA/ANTIPLAT THER USED: HCPCS | Performed by: INTERNAL MEDICINE

## 2018-11-14 PROCEDURE — 4004F PT TOBACCO SCREEN RCVD TLK: CPT | Performed by: INTERNAL MEDICINE

## 2018-11-14 RX ORDER — DOXYCYCLINE HYCLATE 100 MG/1
100 CAPSULE ORAL DAILY
Qty: 10 CAPSULE | Refills: 3 | Status: SHIPPED | OUTPATIENT
Start: 2018-11-14 | End: 2018-11-24

## 2018-11-14 RX ORDER — PREDNISONE 10 MG/1
TABLET ORAL
Qty: 30 TABLET | Refills: 0 | Status: SHIPPED | OUTPATIENT
Start: 2018-11-14 | End: 2018-11-24

## 2018-11-14 RX ORDER — ALBUTEROL SULFATE 2.5 MG/3ML
2.5 SOLUTION RESPIRATORY (INHALATION) EVERY 6 HOURS PRN
Qty: 120 EACH | Refills: 6 | Status: SHIPPED | OUTPATIENT
Start: 2018-11-14

## 2018-11-14 RX ORDER — ALBUTEROL SULFATE 90 UG/1
2 AEROSOL, METERED RESPIRATORY (INHALATION) EVERY 6 HOURS PRN
Qty: 1 INHALER | Refills: 11 | Status: SHIPPED | OUTPATIENT
Start: 2018-11-14 | End: 2019-05-17 | Stop reason: SDUPTHER

## 2018-11-23 RX ORDER — ATORVASTATIN CALCIUM 40 MG/1
TABLET, FILM COATED ORAL
Qty: 30 TABLET | Refills: 5 | Status: SHIPPED | OUTPATIENT
Start: 2018-11-23 | End: 2019-01-31 | Stop reason: SDUPTHER

## 2018-12-14 PROBLEM — R05.9 COUGH: Status: RESOLVED | Noted: 2018-11-14 | Resolved: 2018-12-14

## 2019-01-31 RX ORDER — CLOPIDOGREL BISULFATE 75 MG/1
TABLET ORAL
Qty: 30 TABLET | Refills: 11 | Status: SHIPPED | OUTPATIENT
Start: 2019-01-31 | End: 2019-02-01 | Stop reason: SDUPTHER

## 2019-01-31 RX ORDER — NITROGLYCERIN 0.4 MG/1
0.4 TABLET SUBLINGUAL EVERY 5 MIN PRN
Qty: 25 TABLET | Refills: 1 | Status: SHIPPED | OUTPATIENT
Start: 2019-01-31 | End: 2019-02-01 | Stop reason: SDUPTHER

## 2019-01-31 RX ORDER — ATORVASTATIN CALCIUM 40 MG/1
TABLET, FILM COATED ORAL
Qty: 30 TABLET | Refills: 3 | Status: SHIPPED | OUTPATIENT
Start: 2019-01-31 | End: 2019-02-01 | Stop reason: SDUPTHER

## 2019-02-01 RX ORDER — ATORVASTATIN CALCIUM 40 MG/1
TABLET, FILM COATED ORAL
Qty: 30 TABLET | Refills: 3 | Status: SHIPPED | OUTPATIENT
Start: 2019-02-01

## 2019-02-01 RX ORDER — NITROGLYCERIN 0.4 MG/1
0.4 TABLET SUBLINGUAL EVERY 5 MIN PRN
Qty: 25 TABLET | Refills: 1 | Status: SHIPPED | OUTPATIENT
Start: 2019-02-01

## 2019-02-01 RX ORDER — CLOPIDOGREL BISULFATE 75 MG/1
TABLET ORAL
Qty: 30 TABLET | Refills: 11 | Status: SHIPPED | OUTPATIENT
Start: 2019-02-01

## 2019-03-11 ENCOUNTER — TELEPHONE (OUTPATIENT)
Dept: PULMONOLOGY | Age: 54
End: 2019-03-11

## 2019-05-17 ENCOUNTER — OFFICE VISIT (OUTPATIENT)
Dept: PULMONOLOGY | Age: 54
End: 2019-05-17
Payer: MEDICARE

## 2019-05-17 VITALS — OXYGEN SATURATION: 97 % | DIASTOLIC BLOOD PRESSURE: 64 MMHG | HEART RATE: 62 BPM | SYSTOLIC BLOOD PRESSURE: 138 MMHG

## 2019-05-17 DIAGNOSIS — I10 ESSENTIAL HYPERTENSION: ICD-10-CM

## 2019-05-17 DIAGNOSIS — R06.02 SOB (SHORTNESS OF BREATH): Primary | ICD-10-CM

## 2019-05-17 DIAGNOSIS — J44.9 COPD, SEVERE (HCC): ICD-10-CM

## 2019-05-17 DIAGNOSIS — J45.40 MODERATE PERSISTENT ASTHMA WITHOUT COMPLICATION: ICD-10-CM

## 2019-05-17 PROCEDURE — G8420 CALC BMI NORM PARAMETERS: HCPCS | Performed by: INTERNAL MEDICINE

## 2019-05-17 PROCEDURE — 99214 OFFICE O/P EST MOD 30 MIN: CPT | Performed by: INTERNAL MEDICINE

## 2019-05-17 PROCEDURE — 3023F SPIROM DOC REV: CPT | Performed by: INTERNAL MEDICINE

## 2019-05-17 PROCEDURE — G8598 ASA/ANTIPLAT THER USED: HCPCS | Performed by: INTERNAL MEDICINE

## 2019-05-17 PROCEDURE — G8427 DOCREV CUR MEDS BY ELIG CLIN: HCPCS | Performed by: INTERNAL MEDICINE

## 2019-05-17 PROCEDURE — 4004F PT TOBACCO SCREEN RCVD TLK: CPT | Performed by: INTERNAL MEDICINE

## 2019-05-17 PROCEDURE — G8926 SPIRO NO PERF OR DOC: HCPCS | Performed by: INTERNAL MEDICINE

## 2019-05-17 PROCEDURE — 3017F COLORECTAL CA SCREEN DOC REV: CPT | Performed by: INTERNAL MEDICINE

## 2019-05-17 RX ORDER — ALBUTEROL SULFATE 90 UG/1
2 AEROSOL, METERED RESPIRATORY (INHALATION) EVERY 6 HOURS PRN
Qty: 1 INHALER | Refills: 11 | Status: SHIPPED | OUTPATIENT
Start: 2019-05-17

## 2019-05-17 NOTE — PROGRESS NOTES
[facility]  Progress Note  Jennifer Ribera MD       Andrew Del Rosarioalon   YOB: 1965    Date of Visit:  5/17/2019    Assessment/Plan:  1. SOB (shortness of breath)/Cough  Stable dyspnea  Has productive cough at the moment. 2. COPD, severe ([address]  PFT 4/17:  Spirometry reveals decreased FVC at 1.63 liters which is 59% predicted. FEV1  is decreased at 1.03 liters which is 47% predicted. FEV1/FVC ratio is reduced  at 63%. There is a 21% improvement in FEV1 after inhaled bronchodilators. Lung volumes reveal normal total lung capacity. Residual volume is increased  at 141% predicted. Diffusion capacity is decreased at 63% predicted.     IMPRESSION: Severe obstructive lung disease with air trapping. There is  significant improvement after inhaled bronchodilators. Advair 250/50 bid  Spiriva Respimat  Albuterol prn  HHN. 3. Coronary artery disease involving native coronary artery of native heart without angina pectoris  Stable    4. Moderate persistent asthma without complication  She does have significant reversibility on her PFT  She is on Singulair as well    5. Immunization  PCV 13 UTD  PCV 23 given  Flu shot utd    6. Current Smoker  She has quit smoking    FOLLOW UP: 6 months      Chief Complaint   Patient presents with    Shortness of Breath     6 month f.u. Brought in form to see if she can get a 2 bedroom apartment so she can store O2 tanks in extra bedroom because she has her grandson and afrid he will pull them over on him. HPI  The patient presents with a chief complaint of shortness of breath and cough. She is having some trouble wit hthe spring weather. No recent flare ups/ She has quit smoking. No Chest pain, Nausea or vomiting reported      Review of Systems  As documented in HPI     Physical Exam:  Well developed, well nourished  Alert and oriented  Sclera is clear  No cervical adenopathy  No JVD.   Chest examination is increaed BMI.     Wt Readings from Last 3 Encounters:   18 92 lb (41.7 kg)   18 97 lb (44 kg)   18 74 lb (33.6 kg)     BP Readings from Last 3 Encounters:   19 138/64   18 (!) 130/50   18 124/60        Social History     Tobacco Use   Smoking Status Current Some Day Smoker    Years: 35.00    Types: Cigarettes    Last attempt to quit: 2009    Years since quittin.7   Smokeless Tobacco Never Used   Tobacco Comment    H.O.smoking 1.5 ppd x 35 yrs / Quit 2009

## 2019-11-12 ENCOUNTER — OFFICE VISIT (OUTPATIENT)
Dept: PULMONOLOGY | Age: 54
End: 2019-11-12
Payer: MEDICARE

## 2019-11-12 VITALS — OXYGEN SATURATION: 96 % | SYSTOLIC BLOOD PRESSURE: 130 MMHG | HEART RATE: 87 BPM | DIASTOLIC BLOOD PRESSURE: 64 MMHG

## 2019-11-12 DIAGNOSIS — R06.02 SOB (SHORTNESS OF BREATH): Primary | ICD-10-CM

## 2019-11-12 DIAGNOSIS — I25.10 CORONARY ARTERY DISEASE INVOLVING NATIVE CORONARY ARTERY OF NATIVE HEART WITHOUT ANGINA PECTORIS: ICD-10-CM

## 2019-11-12 DIAGNOSIS — J44.9 COPD, SEVERE (HCC): ICD-10-CM

## 2019-11-12 DIAGNOSIS — J45.40 MODERATE PERSISTENT ASTHMA WITHOUT COMPLICATION: ICD-10-CM

## 2019-11-12 PROCEDURE — 99213 OFFICE O/P EST LOW 20 MIN: CPT | Performed by: INTERNAL MEDICINE

## 2019-11-12 PROCEDURE — G8427 DOCREV CUR MEDS BY ELIG CLIN: HCPCS | Performed by: INTERNAL MEDICINE

## 2019-11-12 PROCEDURE — 90686 IIV4 VACC NO PRSV 0.5 ML IM: CPT | Performed by: INTERNAL MEDICINE

## 2019-11-12 PROCEDURE — G0008 ADMIN INFLUENZA VIRUS VAC: HCPCS | Performed by: INTERNAL MEDICINE

## 2019-11-12 PROCEDURE — 3017F COLORECTAL CA SCREEN DOC REV: CPT | Performed by: INTERNAL MEDICINE

## 2019-11-12 PROCEDURE — G8926 SPIRO NO PERF OR DOC: HCPCS | Performed by: INTERNAL MEDICINE

## 2019-11-12 PROCEDURE — G8598 ASA/ANTIPLAT THER USED: HCPCS | Performed by: INTERNAL MEDICINE

## 2019-11-12 PROCEDURE — 3023F SPIROM DOC REV: CPT | Performed by: INTERNAL MEDICINE

## 2019-11-12 PROCEDURE — G8482 FLU IMMUNIZE ORDER/ADMIN: HCPCS | Performed by: INTERNAL MEDICINE

## 2019-11-12 PROCEDURE — G8420 CALC BMI NORM PARAMETERS: HCPCS | Performed by: INTERNAL MEDICINE

## 2019-11-12 PROCEDURE — 4004F PT TOBACCO SCREEN RCVD TLK: CPT | Performed by: INTERNAL MEDICINE

## 2019-11-12 RX ORDER — AMLODIPINE BESYLATE 5 MG/1
10 TABLET ORAL DAILY
COMMUNITY
Start: 2019-10-22

## 2020-02-17 ENCOUNTER — TELEPHONE (OUTPATIENT)
Dept: PULMONOLOGY | Age: 55
End: 2020-02-17

## 2020-02-17 NOTE — TELEPHONE ENCOUNTER
Patient called back wanted to clarify that she has portable nebulizer and portable oxygen concentrator.

## 2020-02-17 NOTE — TELEPHONE ENCOUNTER
Dr Fermín Keane, can you go back to pt's last appt 11-12-19 and put in addendum that pt uses nebulizer and benefits from it for treatment of her asthma?

## 2020-02-17 NOTE — TELEPHONE ENCOUNTER
Called pt to see if she has received any supplies from [address] since she got her nebulizer back in 2017 from [address] [place] [address]. Pt stated she has not. Went ahead and faxed order to [employer] to see if we can just get thru them since they are her O2 supplier. She will call us if she has any trouble getting nebulizer.

## 2020-07-17 ENCOUNTER — TELEPHONE (OUTPATIENT)
Dept: PULMONOLOGY | Age: 55
End: 2020-07-17

## 2020-07-17 RX ORDER — DOXYCYCLINE HYCLATE 100 MG
100 TABLET ORAL DAILY
Qty: 10 TABLET | Refills: 0 | Status: SHIPPED | OUTPATIENT
Start: 2020-07-17 | End: 2020-07-27

## 2020-07-17 NOTE — TELEPHONE ENCOUNTER
Pt started coughing up green mucus yesterday / hoarseness / no fever / no N/V / now body aches / no signs of COVID-19     Pt uses [facility] on file    No Allergies to ATB    Pt can be reached at : [phone]

## 2021-03-11 ENCOUNTER — VIRTUAL VISIT (OUTPATIENT)
Dept: PULMONOLOGY | Age: 56
End: 2021-03-11
Payer: MEDICARE

## 2021-03-11 DIAGNOSIS — R06.02 SOB (SHORTNESS OF BREATH): ICD-10-CM

## 2021-03-11 DIAGNOSIS — J44.9 COPD, SEVERE (HCC): Primary | ICD-10-CM

## 2021-03-11 DIAGNOSIS — J45.40 MODERATE PERSISTENT ASTHMA WITHOUT COMPLICATION: ICD-10-CM

## 2021-03-11 PROCEDURE — 99214 OFFICE O/P EST MOD 30 MIN: CPT | Performed by: NURSE PRACTITIONER

## 2021-03-11 PROCEDURE — G8428 CUR MEDS NOT DOCUMENT: HCPCS | Performed by: NURSE PRACTITIONER

## 2021-03-11 PROCEDURE — 3017F COLORECTAL CA SCREEN DOC REV: CPT | Performed by: NURSE PRACTITIONER

## 2021-03-11 RX ORDER — ALBUTEROL SULFATE 90 UG/1
2 AEROSOL, METERED RESPIRATORY (INHALATION) EVERY 6 HOURS PRN
Qty: 1 INHALER | Refills: 11 | Status: SHIPPED | OUTPATIENT
Start: 2021-03-11 | End: 2021-10-14 | Stop reason: SDUPTHER

## 2021-03-11 RX ORDER — ALBUTEROL SULFATE 2.5 MG/3ML
2.5 SOLUTION RESPIRATORY (INHALATION) EVERY 6 HOURS PRN
Qty: 120 EACH | Refills: 11 | Status: SHIPPED | OUTPATIENT
Start: 2021-03-11 | End: 2021-10-14 | Stop reason: SDUPTHER

## 2021-03-11 ASSESSMENT — ENCOUNTER SYMPTOMS
CONSTIPATION: 0
SHORTNESS OF BREATH: 1
ABDOMINAL PAIN: 0
COLOR CHANGE: 0
COUGH: 1

## 2021-03-11 NOTE — PROGRESS NOTES
[facility] Pulmonary Outpatient Follow Up Note  Pulmonology Video Visit    Pursuant to the emergency declaration under the [facility] waiver authority and the Coronavirus Preparedness and Response Supplemental Appropriations Act this Video Visit was insisted, with patient's consent, to reduce the patient's risk of exposure to COVID-19 and provide continuity of care for an established patient. The patient was at home, while the provider was at the clinic. Services were provided through a synchronous discussion through a Video Visit to substitute for in-person clinic visit, and coded as such. Subjective:   CHIEF COMPLAINT / HPI: Severe COPD   The patient is 61 y.o. female who presents today for a routine follow up visit related to the above mentioned issues. There is a PMH significant for other conditions including CAD, asthma and colon cancer. She was last evaluated by Dr. Tyrese De Leon in 2019. At that time pulmonary symptoms were relatively stable. Presently she reports her SOB has been worse over the last year. She does have productive cough of light green mucous. This is most common in the morning and is chronic. This is not particularly worse over the last year. There are no fevers or chills. She does report that she has been the primary care giver for her 10year old grandson and she attributes SOB to running after him. She is compliance with Advair, Spiriva, TYSON. She typically uses her nebulizer in the morning and this helps her bring up mucous. She also wears O2 at night.         Past Medical History:   Diagnosis Date    Asthma     CAD (coronary artery disease) 11/14/2012    Colon cancer [facility])     dx 2001    COPD ([facility] obstructive [facility] disease) [facility] [address]     Emphysema     Essential hypertension 7/7/2017    GERD (gastroesophageal reflux disease)     Heart attack ([facility] [address]     Other and unspecified hyperlipidemia 11/14/2012    Pneumonia      Social History: Social History     Tobacco Use   Smoking Status Current Some Day Smoker    Years: 35.00    Types: Cigarettes    Last attempt to quit: 2009    Years since quittin.5   Smokeless Tobacco Never Used   Tobacco Comment    H.O.smoking 1.5 ppd x 35 yrs / Quit 2009     Current Medications:     Current Outpatient Medications on File Prior to Visit   Medication Sig Dispense Refill    amLODIPine (NORVASC) 5 MG tablet Take 1 tablet by mouth daily      DULoxetine HCl (CYMBALTA PO) Take by mouth      albuterol sulfate HFA (VENTOLIN HFA) 108 (90 Base) MCG/ACT inhaler Inhale 2 puffs into the lungs every 6 hours as needed for Wheezing 1 Inhaler 11    metoprolol tartrate (LOPRESSOR) 25 MG tablet 1 TAB BY MOUTH TWICE DAILY 60 tablet 11    atorvastatin (LIPITOR) 40 MG tablet 1 TAB BY MOUTH DAILY 30 tablet 3    clopidogrel (PLAVIX) 75 MG tablet 1 TAB BY MOUTH DAILY 30 tablet 11    nitroGLYCERIN (NITROSTAT) 0.4 MG SL tablet Place 1 tablet under the tongue every 5 minutes as needed for Chest pain 25 tablet 1    albuterol (PROVENTIL) (2.5 MG/3ML) 0.083% nebulizer solution Take 3 mLs by nebulization every 6 hours as needed for Wheezing DX:COPD J44.9 120 each 6    pantoprazole (PROTONIX) 40 MG tablet Take 40 mg by mouth daily      aspirin 81 MG chewable tablet Take 1 tablet by mouth daily 30 tablet 11    busPIRone (BUSPAR) 5 MG tablet Take 30 mg by mouth 2 times daily       escitalopram (LEXAPRO) 10 MG tablet Take 20 mg by mouth daily        No current facility-administered medications on file prior to visit. Review of Systems   Constitutional: Negative for chills and fever. HENT: Negative for congestion and postnasal drip. Respiratory: Positive for cough and shortness of breath. Cardiovascular: Negative for chest pain and leg swelling. Gastrointestinal: Negative for abdominal pain and constipation. Musculoskeletal: Negative for arthralgias and joint swelling.    Skin: Negative for color change and pallor. Allergic/Immunologic: Negative for environmental allergies and food allergies. Psychiatric/Behavioral: Negative for agitation and confusion. Objective:       VITALS:  There were no vitals taken for this visit. Physical exam:  No in-personal physical exam was conducted as visit was done via video. The patient was alert and oriented throughout our conversation. She had good coloration. She was not SOB with speaking or otherwise distressed. She had no obvious edema. DATA:      Radiology Review:  Pertinent images / reports were reviewed as a part of this visit. CXR done 2017 reveals the following: The exam is limited by the patient's size. The heart and pulmonary vascularity are within normal limits. There are no focal areas of consolidation or pleural effusion. Last PFTs done 2017:  Spirometry reveals decreased FVC at 1.63 liters which is 59% predicted. FEV1  is decreased at 1.03 liters which is 47% predicted. FEV1/FVC ratio is reduced  at 63%. There is a 21% improvement in FEV1 after inhaled bronchodilators. Lung volumes reveal normal total lung capacity. Residual volume is increased  at 141% predicted. Diffusion capacity is decreased at 63% predicted. IMPRESSION:  Severe obstructive lung disease with air trapping. There is  significant improvement after inhaled bronchodilators. Assessment / Plan:   1. Chronic hypoxemic respiratory failure  - Uses O2 only at night  - Her sats have been OK at home per her report  - I think she would benefit from an exercise ox on RA in the office at her next visit to determine need for supplemental O2 during the day    2. COPD, severe ([address]  - SOB has been worse over the last year  - She does cough particularly in the morning  - Increase TYSON HHN to at least BID, instructed to use up to QID  - Not smoking    3.  Moderate persistent asthma without complication  - Does have asthma phenotype  - Good reversibility on last PFT  - May need more dedicated asthma w/u if symptoms persist    4. SOB (shortness of breath)  - Worse over the last year w/ ACOS  - Repeat CXR and PFT for further evaluation     Return in about 4 weeks (around 4/8/2021) for short term follow up of symptoms.    Jay Cuello MSN APRN-ACNP CCRN

## 2021-03-24 ENCOUNTER — HOSPITAL ENCOUNTER (OUTPATIENT)
Age: 56
Discharge: HOME | End: 2021-03-24
Payer: MEDICARE

## 2021-03-24 ENCOUNTER — HOSPITAL ENCOUNTER (OUTPATIENT)
Dept: GENERAL RADIOLOGY | Age: 56
Discharge: HOME | End: 2021-03-24
Payer: MEDICARE

## 2021-03-24 DIAGNOSIS — J44.9 COPD, SEVERE (HCC): ICD-10-CM

## 2021-03-24 PROCEDURE — 71046 X-RAY EXAM CHEST 2 VIEWS: CPT

## 2021-03-29 ENCOUNTER — HOSPITAL ENCOUNTER (OUTPATIENT)
Dept: PULMONOLOGY | Age: 56
Discharge: HOME | End: 2021-03-29
Payer: MEDICARE

## 2021-03-29 VITALS — OXYGEN SATURATION: 99 % | HEART RATE: 70 BPM | RESPIRATION RATE: 16 BRPM

## 2021-03-29 LAB
DLCO %PRED: 62 %
DLCO PRED: NORMAL
DLCO/VA %PRED: NORMAL
DLCO/VA PRED: NORMAL
DLCO/VA: NORMAL
DLCO: NORMAL
EXPIRATORY TIME-POST: NORMAL
EXPIRATORY TIME: NORMAL
FEF 25-75% %CHNG: NORMAL
FEF 25-75% %PRED-POST: NORMAL
FEF 25-75% %PRED-PRE: NORMAL
FEF 25-75% PRED: NORMAL
FEF 25-75%-POST: NORMAL
FEF 25-75%-PRE: NORMAL
FEV1 %PRED-POST: 58 %
FEV1 %PRED-PRE: 49 %
FEV1 PRED: NORMAL
FEV1-POST: NORMAL
FEV1-PRE: NORMAL
FEV1/FVC %PRED-POST: NORMAL
FEV1/FVC %PRED-PRE: NORMAL
FEV1/FVC PRED: NORMAL
FEV1/FVC-POST: 76 %
FEV1/FVC-PRE: 72 %
FVC %PRED-POST: NORMAL
FVC %PRED-PRE: NORMAL
FVC PRED: NORMAL
FVC-POST: NORMAL
FVC-PRE: NORMAL
GAW %PRED: NORMAL
GAW PRED: NORMAL
GAW: NORMAL
IC %PRED: NORMAL
IC PRED: NORMAL
IC: NORMAL
MEP: NORMAL
MIP: NORMAL
MVV %PRED-PRE: NORMAL
MVV PRED: NORMAL
MVV-PRE: NORMAL
PEF %PRED-POST: NORMAL
PEF %PRED-PRE: NORMAL
PEF PRED: NORMAL
PEF%CHNG: NORMAL
PEF-POST: NORMAL
PEF-PRE: NORMAL
RAW %PRED: NORMAL
RAW PRED: NORMAL
RAW: NORMAL
RV %PRED: NORMAL
RV PRED: NORMAL
RV: NORMAL
SVC %PRED: NORMAL
SVC PRED: NORMAL
SVC: NORMAL
TLC %PRED: 81 %
TLC PRED: NORMAL
TLC: NORMAL
VA %PRED: NORMAL
VA PRED: NORMAL
VA: NORMAL
VTG %PRED: NORMAL
VTG PRED: NORMAL
VTG: NORMAL

## 2021-03-29 PROCEDURE — 6370000000 HC RX 637 (ALT 250 FOR IP): Performed by: NURSE PRACTITIONER

## 2021-03-29 PROCEDURE — 94729 DIFFUSING CAPACITY: CPT

## 2021-03-29 PROCEDURE — 94760 N-INVAS EAR/PLS OXIMETRY 1: CPT

## 2021-03-29 PROCEDURE — 94200 LUNG FUNCTION TEST (MBC/MVV): CPT

## 2021-03-29 PROCEDURE — 94726 PLETHYSMOGRAPHY LUNG VOLUMES: CPT

## 2021-03-29 PROCEDURE — 94060 EVALUATION OF WHEEZING: CPT

## 2021-03-29 RX ORDER — ALBUTEROL SULFATE 90 UG/1
4 AEROSOL, METERED RESPIRATORY (INHALATION) ONCE
Status: COMPLETED | OUTPATIENT
Start: 2021-03-29 | End: 2021-03-29

## 2021-03-29 RX ADMIN — Medication 4 PUFF: at 09:59

## 2021-03-29 ASSESSMENT — PULMONARY FUNCTION TESTS
FEV1_PERCENT_PREDICTED_POST: 58
FEV1/FVC_PRE: 72
FEV1/FVC_POST: 76
FEV1_PERCENT_PREDICTED_PRE: 49

## 2021-04-08 ENCOUNTER — OFFICE VISIT (OUTPATIENT)
Dept: PULMONOLOGY | Age: 56
End: 2021-04-08
Payer: MEDICARE

## 2021-04-08 VITALS — DIASTOLIC BLOOD PRESSURE: 71 MMHG | OXYGEN SATURATION: 98 % | HEART RATE: 84 BPM | SYSTOLIC BLOOD PRESSURE: 131 MMHG

## 2021-04-08 DIAGNOSIS — J96.11 CHRONIC RESPIRATORY FAILURE WITH HYPOXIA (HCC): ICD-10-CM

## 2021-04-08 DIAGNOSIS — J44.9 COPD, SEVERE (HCC): ICD-10-CM

## 2021-04-08 DIAGNOSIS — J45.40 MODERATE PERSISTENT ASTHMA WITHOUT COMPLICATION: Primary | ICD-10-CM

## 2021-04-08 PROCEDURE — 3023F SPIROM DOC REV: CPT | Performed by: NURSE PRACTITIONER

## 2021-04-08 PROCEDURE — G8926 SPIRO NO PERF OR DOC: HCPCS | Performed by: NURSE PRACTITIONER

## 2021-04-08 PROCEDURE — 1036F TOBACCO NON-USER: CPT | Performed by: NURSE PRACTITIONER

## 2021-04-08 PROCEDURE — G8421 BMI NOT CALCULATED: HCPCS | Performed by: NURSE PRACTITIONER

## 2021-04-08 PROCEDURE — G8427 DOCREV CUR MEDS BY ELIG CLIN: HCPCS | Performed by: NURSE PRACTITIONER

## 2021-04-08 PROCEDURE — 3017F COLORECTAL CA SCREEN DOC REV: CPT | Performed by: NURSE PRACTITIONER

## 2021-04-08 PROCEDURE — 99214 OFFICE O/P EST MOD 30 MIN: CPT | Performed by: NURSE PRACTITIONER

## 2021-04-08 RX ORDER — SERTRALINE HYDROCHLORIDE 100 MG/1
100 TABLET, FILM COATED ORAL DAILY
COMMUNITY

## 2021-04-08 ASSESSMENT — ENCOUNTER SYMPTOMS
CONSTIPATION: 0
COLOR CHANGE: 0
SHORTNESS OF BREATH: 1
ABDOMINAL PAIN: 0
COUGH: 1

## 2021-04-08 NOTE — PROGRESS NOTES
[facility] Follow Up Note    Subjective:   CHIEF COMPLAINT / HPI: Severe COPD   The patient is 61 y.o. female who presents today for a routine follow up visit related to the above mentioned issues. There is a PMH significant for other conditions including CAD, asthma and colon cancer. She was last evaluated by me in March. At that time pulmonary symptoms were had been worse over the preceding year. We ordered CXR and repeat PFT for further evaluation. Presently she reports her breathing is a bit better. She notes that she is usually worse with the heat and the cold. She continues to experience a chronic productive cough of light green mucous. This is not particularly worse over the last year. She is compliant with Advair, Spiriva, TYSON. She typically uses her nebulizer in the morning and has added a nighttime treatment as well which feels like it has helped. She also wears O2 at night.         Past Medical History:   Diagnosis Date    Asthma     CAD (coronary artery disease) 2012    [facility] [address]     dx     COPD [facility]chronic obstructive pulmonary disease) [address] [address]     Emphysema     Essential hypertension 2017    GERD (gastroesophageal reflux disease)     Heart attack ([facility] [address]     Other and unspecified hyperlipidemia 2012    Pneumonia      Social History:    Social History     Tobacco Use   Smoking Status Former Smoker    Years: 35.00    Types: Cigarettes    Quit date: 2018    Years since quittin.6   Smokeless Tobacco Never Used   Tobacco Comment    H.O.smoking 1.5 ppd x 35 yrs / Quit 2009     Current Medications:     Current Outpatient Medications on File Prior to Visit   Medication Sig Dispense Refill    sertraline (ZOLOFT) 100 MG tablet Take 100 mg by mouth daily      fluticasone-salmeterol (ADVAIR DISKUS) 250-50 MCG/DOSE AEPB Inhale 1 puff into the lungs every 12 hours 60 each 3    tiotropium (SPIRIVA RESPIMAT) 2.5 MCG/ACT AERS inhaler Inhale 2 Physical Exam   Constitutional: She is oriented to person, place, and time. She appears well-developed and well-nourished. No distress. HENT:   Head: Normocephalic and atraumatic. Eyes: Right eye exhibits no discharge. Left eye exhibits no discharge. Neck: No tracheal deviation present. Cardiovascular: Normal rate and regular rhythm. Pulmonary/Chest: No stridor. No respiratory distress. She has no wheezes. She has no rales. She exhibits no tenderness. Abdominal: She exhibits no distension. There is no abdominal tenderness. Musculoskeletal:         General: No edema. Neurological: She is alert and oriented to person, place, and time. Skin: Skin is warm and dry. No rash noted. She is not diaphoretic. Psychiatric: She has a normal mood and affect. Her behavior is normal.   Vitals reviewed. DATA:      Radiology Review:  Pertinent images / reports were reviewed as a part of this visit. CXR done March 2021:  Heart size and pulmonary vasculature within normal limits. Lungs clear. Costophrenic angles sharp. Suture anchors in the proximal right humerus     Last PFTs done March 2021:  Spirometry reveals decreased FVC at 1.8 liters which is 67% predicted. FEV1 is decreased at 1.02 liters which is 49% predicted. FEV1/FVC ratio  is reduced at 57%. Expiratory flow rates are reduced. There is  significant improvement after inhaled bronchodilators. Lung volumes  reveal normal total lung capacity, vital capacity and residual volume. Diffusion capacity is normal.     IMPRESSION:  Severe obstructive lung disease with a good response to  inhaled bronchodilators. Assessment / Plan:   1. Chronic respiratory failure with hypoxia (HCC)  - Continues to benefit from O2 at night    2.  COPD, severe ([address]  - Improved pulmonary symptoms with weather change and the addition of a nighttime nebulizer treatment  - Continue BID nebs  - Continue Advair / Spiriva  - PFT c/w with severe OLD with good response to TYSON  - She would benefit from vaccination for COVID-19, this was scheduled for her during her office visit  - She remains a non smoker    3. Moderate persistent asthma without complication  - She does have an asthma phenotype  - PFT shows good response to IBD which reinforces the need for her to use this more frequently when SOB is worse  -She has not had flares requiring steroids or hospitalizations over the last year  - If this changes, consider repeat CBC and respiratory allergen profile     Return in about 6 months (around 10/8/2021). RTC sooner if symptoms worsen.   Rita Gomez MSN APRN-ACNP CCRN

## 2021-10-14 ENCOUNTER — OFFICE VISIT (OUTPATIENT)
Dept: PULMONOLOGY | Age: 56
End: 2021-10-14
Payer: MEDICARE

## 2021-10-14 VITALS — OXYGEN SATURATION: 94 % | HEART RATE: 77 BPM

## 2021-10-14 DIAGNOSIS — J44.9 COPD, SEVERE (HCC): ICD-10-CM

## 2021-10-14 DIAGNOSIS — J96.11 CHRONIC RESPIRATORY FAILURE WITH HYPOXIA (HCC): Primary | ICD-10-CM

## 2021-10-14 PROCEDURE — 3023F SPIROM DOC REV: CPT | Performed by: NURSE PRACTITIONER

## 2021-10-14 PROCEDURE — G8484 FLU IMMUNIZE NO ADMIN: HCPCS | Performed by: NURSE PRACTITIONER

## 2021-10-14 PROCEDURE — 99213 OFFICE O/P EST LOW 20 MIN: CPT | Performed by: NURSE PRACTITIONER

## 2021-10-14 PROCEDURE — 1036F TOBACCO NON-USER: CPT | Performed by: NURSE PRACTITIONER

## 2021-10-14 PROCEDURE — 3017F COLORECTAL CA SCREEN DOC REV: CPT | Performed by: NURSE PRACTITIONER

## 2021-10-14 PROCEDURE — G8421 BMI NOT CALCULATED: HCPCS | Performed by: NURSE PRACTITIONER

## 2021-10-14 PROCEDURE — G8427 DOCREV CUR MEDS BY ELIG CLIN: HCPCS | Performed by: NURSE PRACTITIONER

## 2021-10-14 PROCEDURE — G8926 SPIRO NO PERF OR DOC: HCPCS | Performed by: NURSE PRACTITIONER

## 2021-10-14 RX ORDER — ALBUTEROL SULFATE 90 UG/1
2 AEROSOL, METERED RESPIRATORY (INHALATION) EVERY 6 HOURS PRN
Qty: 1 EACH | Refills: 5 | Status: SHIPPED | OUTPATIENT
Start: 2021-10-14 | End: 2022-10-14

## 2021-10-14 RX ORDER — ALBUTEROL SULFATE 2.5 MG/3ML
2.5 SOLUTION RESPIRATORY (INHALATION) EVERY 6 HOURS PRN
Qty: 120 EACH | Refills: 11 | Status: SHIPPED | OUTPATIENT
Start: 2021-10-14

## 2021-10-14 ASSESSMENT — ENCOUNTER SYMPTOMS
CONSTIPATION: 0
ABDOMINAL PAIN: 0
SHORTNESS OF BREATH: 1
COUGH: 1
COLOR CHANGE: 0

## 2021-10-14 NOTE — PROGRESS NOTES
[facility] Follow Up Note    Subjective:   CHIEF COMPLAINT / HPI: Severe COPD   The patient is 61 y.o. female who presents today for a routine follow up visit related to the above mentioned issues. There is a PMH significant for other conditions including CAD, asthma and colon cancer. She was last evaluated by me in April. At that time pulmonary symptoms were a bit better. Presently she notes good days and bad days with her breathing. She reports this is usually worst in the morning. She continues to experience a chronic productive cough of light green mucous. This is not particularly worse over the last year. She is compliant with Advair, Spiriva, TYSON. She typically uses her nebulizer in the morning and has added a nighttime treatment as well which feels like it has helped. She also wears O2 at night. She has had both COVID vaccinations but doesn't not want to pursue third dose d/t concerns over side effects.         Past Medical History:   Diagnosis Date    Asthma     CAD (coronary artery disease) 11/14/2012    [facility] ([facility] [address]     dx 2001    COPD (chronic obstructive pulmonary disease) ([facility] [address]     Emphysema     Essential hypertension 7/7/2017    GERD (gastroesophageal reflux disease)     Heart attack ([facility] [address]     Other and unspecified hyperlipidemia 11/14/2012    Pneumonia      Social History:    Social History     Tobacco Use   Smoking Status Former Smoker    Years: 35.00    Types: Cigarettes    Quit date: 8/20/2018    Years since quitting: 3.1   Smokeless Tobacco Never Used   Tobacco Comment    H.O.smoking 1.5 ppd x 35 yrs / Quit 8/6/2009     Current Medications:     Current Outpatient Medications on File Prior to Visit   Medication Sig Dispense Refill    sertraline (ZOLOFT) 100 MG tablet Take 100 mg by mouth daily      amLODIPine (NORVASC) 5 MG tablet Take 10 mg by mouth daily       albuterol sulfate HFA (VENTOLIN HFA) 108 (90 Base) MCG/ACT inhaler Inhale 2 puffs into the lungs every 6 hours as needed for Wheezing 1 Inhaler 11    metoprolol tartrate (LOPRESSOR) 25 MG tablet 1 TAB BY MOUTH TWICE DAILY 60 tablet 11    atorvastatin (LIPITOR) 40 MG tablet 1 TAB BY MOUTH DAILY 30 tablet 3    clopidogrel (PLAVIX) 75 MG tablet 1 TAB BY MOUTH DAILY 30 tablet 11    nitroGLYCERIN (NITROSTAT) 0.4 MG SL tablet Place 1 tablet under the tongue every 5 minutes as needed for Chest pain 25 tablet 1    albuterol (PROVENTIL) (2.5 MG/3ML) 0.083% nebulizer solution Take 3 mLs by nebulization every 6 hours as needed for Wheezing DX:COPD J44.9 120 each 6    aspirin 81 MG chewable tablet Take 1 tablet by mouth daily 30 tablet 11    busPIRone (BUSPAR) 5 MG tablet Take 15 mg by mouth 3 times daily        No current facility-administered medications on file prior to visit. Review of Systems   Constitutional: Negative for chills and fever. HENT: Negative for congestion and postnasal drip. Respiratory: Positive for cough and shortness of breath. Cardiovascular: Negative for chest pain and leg swelling. Gastrointestinal: Negative for abdominal pain and constipation. Musculoskeletal: Negative for arthralgias and joint swelling. Skin: Negative for color change and pallor. Allergic/Immunologic: Negative for environmental allergies and food allergies. Psychiatric/Behavioral: Negative for agitation and confusion. Objective:       VITALS:  Pulse 77   SpO2 94% Comment: RA at rest     Physical Exam  Vitals reviewed. Constitutional:       General: She is not in acute distress. Appearance: She is well-developed. She is not diaphoretic. HENT:      Head: Normocephalic and atraumatic. Eyes:      General:         Right eye: No discharge. Left eye: No discharge. Neck:      Trachea: No tracheal deviation. Cardiovascular:      Rate and Rhythm: Normal rate and regular rhythm. Pulmonary:      Effort: No respiratory distress. Breath sounds: No stridor.  Rhonchi present. No wheezing or rales. Chest:      Chest wall: No tenderness. Abdominal:      General: There is no distension. Tenderness: There is no abdominal tenderness. Skin:     General: Skin is warm and dry. Findings: No rash. Neurological:      Mental Status: She is alert and oriented to person, place, and time. Psychiatric:         Behavior: Behavior normal.         DATA:      Radiology Review:  Pertinent images / reports were reviewed as a part of this visit. CXR done March 2021:  Heart size and pulmonary vasculature within normal limits. Lungs clear. Costophrenic angles sharp. Suture anchors in the proximal right humerus     Last PFTs done March 2021:  Spirometry reveals decreased FVC at 1.8 liters which is 67% predicted. FEV1 is decreased at 1.02 liters which is 49% predicted. FEV1/FVC ratio  is reduced at 57%. Expiratory flow rates are reduced. There is  significant improvement after inhaled bronchodilators. Lung volumes  reveal normal total lung capacity, vital capacity and residual volume. Diffusion capacity is normal.     IMPRESSION:  Severe obstructive lung disease with a good response to  inhaled bronchodilators. Assessment / Plan:   1. Chronic respiratory failure with hypoxia (HCC)  - Good saturations on RA at rest in the office today  - She continues to benefit from supplemental O2 with sleep    2. COPD, severe ([address]  - Stable MORENO, though worse in the AM  - Recommend more intention use of Albuterol HHN in the AM  - Continue Advair, Spiriva, Albuterol  - These were all refilled   - Last imaging stable, without obvious nodules or acute changes  - Flu shot today      Return in about 6 months (around 4/14/2022). RTC sooner if symptoms worsen.   Radha Medley MSN APRN-ACNP CCRN

## 2022-04-14 ENCOUNTER — OFFICE VISIT (OUTPATIENT)
Dept: PULMONOLOGY | Age: 57
End: 2022-04-14
Payer: MEDICARE

## 2022-04-14 VITALS
OXYGEN SATURATION: 94 % | HEIGHT: 58 IN | BODY MASS INDEX: 23.72 KG/M2 | WEIGHT: 113 LBS | HEART RATE: 75 BPM | SYSTOLIC BLOOD PRESSURE: 120 MMHG | DIASTOLIC BLOOD PRESSURE: 62 MMHG

## 2022-04-14 DIAGNOSIS — J44.9 COPD, SEVERE (HCC): ICD-10-CM

## 2022-04-14 DIAGNOSIS — J96.11 CHRONIC RESPIRATORY FAILURE WITH HYPOXIA (HCC): Primary | ICD-10-CM

## 2022-04-14 PROCEDURE — G8420 CALC BMI NORM PARAMETERS: HCPCS | Performed by: NURSE PRACTITIONER

## 2022-04-14 PROCEDURE — 99213 OFFICE O/P EST LOW 20 MIN: CPT | Performed by: NURSE PRACTITIONER

## 2022-04-14 PROCEDURE — 3017F COLORECTAL CA SCREEN DOC REV: CPT | Performed by: NURSE PRACTITIONER

## 2022-04-14 PROCEDURE — G8427 DOCREV CUR MEDS BY ELIG CLIN: HCPCS | Performed by: NURSE PRACTITIONER

## 2022-04-14 PROCEDURE — 3023F SPIROM DOC REV: CPT | Performed by: NURSE PRACTITIONER

## 2022-04-14 PROCEDURE — 1036F TOBACCO NON-USER: CPT | Performed by: NURSE PRACTITIONER

## 2022-04-14 RX ORDER — ISOSORBIDE MONONITRATE 60 MG/1
60 TABLET, EXTENDED RELEASE ORAL DAILY
COMMUNITY
Start: 2022-03-25

## 2022-04-14 RX ORDER — TOPIRAMATE 50 MG/1
100 TABLET, FILM COATED ORAL NIGHTLY
COMMUNITY
Start: 2022-03-13

## 2022-04-14 ASSESSMENT — ENCOUNTER SYMPTOMS
COLOR CHANGE: 0
COUGH: 1
SHORTNESS OF BREATH: 1
ABDOMINAL PAIN: 0
CONSTIPATION: 0

## 2022-04-14 NOTE — PROGRESS NOTES
[facility] Follow Up Note    Subjective:   CHIEF COMPLAINT / HPI: Severe COPD   The patient is 61 y.o. female who presents today for a routine follow up visit related to the above mentioned issues. There is a PMH significant for other conditions including CAD, asthma and colon cancer. She was last evaluated by me in October. At that time she reported good days and bad days with her breathing. Presently she notes her breathing has been doing well. She does cough everyday. She reports this is usually worst in the morning. This is not particularly worse over the last year. She has not had any episodes of AECOPD since her last visit. She is compliant with Advair, Spiriva, TYSON. She typically uses her nebulizer 1 x per week. This helps her when phlegm is worse. She also wears O2 at night. She has had both COVID vaccinations but doesn't not want to pursue third dose d/t concerns over side effects.         Past Medical History:   Diagnosis Date    Asthma     CAD (coronary artery disease) 11/14/2012    [facility] ([facility] [address]     dx 2001    COPD (chronic obstructive pulmonary disease) ([facility] [address]     Emphysema     Essential hypertension 7/7/2017    GERD (gastroesophageal reflux disease)     Heart attack ([facility] [address]     Other and unspecified hyperlipidemia 11/14/2012    Pneumonia      Social History:    Social History     Tobacco Use   Smoking Status Former Smoker    Years: 35.00    Types: Cigarettes    Quit date: 8/20/2018    Years since quitting: 3.6   Smokeless Tobacco Never Used   Tobacco Comment    H.O.smoking 1.5 ppd x 35 yrs / Quit 8/6/2009     Current Medications:     Current Outpatient Medications on File Prior to Visit   Medication Sig Dispense Refill    isosorbide mononitrate (IMDUR) 60 MG extended release tablet Take 60 mg by mouth daily      topiramate (TOPAMAX) 50 MG tablet Take 100 mg by mouth at bedtime      fluticasone-salmeterol (ADVAIR DISKUS) 250-50 MCG/DOSE AEPB Inhale 1 puff into the lungs every 12 hours 1 each 5    tiotropium (SPIRIVA RESPIMAT) 2.5 MCG/ACT AERS inhaler Inhale 2 puffs into the lungs daily 1 each 3    albuterol sulfate  (90 Base) MCG/ACT inhaler Inhale 2 puffs into the lungs every 6 hours as needed for Wheezing 1 each 5    albuterol (PROVENTIL) (2.5 MG/3ML) 0.083% nebulizer solution Take 3 mLs by nebulization every 6 hours as needed for Wheezing DX:COPD J44.9 120 each 11    sertraline (ZOLOFT) 100 MG tablet Take 100 mg by mouth daily      amLODIPine (NORVASC) 5 MG tablet Take 10 mg by mouth daily       albuterol sulfate HFA (VENTOLIN HFA) 108 (90 Base) MCG/ACT inhaler Inhale 2 puffs into the lungs every 6 hours as needed for Wheezing 1 Inhaler 11    metoprolol tartrate (LOPRESSOR) 25 MG tablet 1 TAB BY MOUTH TWICE DAILY 60 tablet 11    atorvastatin (LIPITOR) 40 MG tablet 1 TAB BY MOUTH DAILY 30 tablet 3    clopidogrel (PLAVIX) 75 MG tablet 1 TAB BY MOUTH DAILY 30 tablet 11    nitroGLYCERIN (NITROSTAT) 0.4 MG SL tablet Place 1 tablet under the tongue every 5 minutes as needed for Chest pain 25 tablet 1    albuterol (PROVENTIL) (2.5 MG/3ML) 0.083% nebulizer solution Take 3 mLs by nebulization every 6 hours as needed for Wheezing DX:COPD J44.9 120 each 6    aspirin 81 MG chewable tablet Take 1 tablet by mouth daily 30 tablet 11    busPIRone (BUSPAR) 5 MG tablet Take 15 mg by mouth 3 times daily        No current facility-administered medications on file prior to visit. Review of Systems   Constitutional: Negative for chills and fever. HENT: Negative for congestion and postnasal drip. Respiratory: Positive for cough and shortness of breath. Cardiovascular: Negative for chest pain and leg swelling. Gastrointestinal: Negative for abdominal pain and constipation. Musculoskeletal: Negative for arthralgias and joint swelling. Skin: Negative for color change and pallor.    Allergic/Immunologic: Negative for environmental allergies and food allergies. Psychiatric/Behavioral: Negative for agitation and confusion. Objective:       VITALS:  /62   Pulse 75   Ht 4' 10\" (1.473 m)   Wt 113 lb (51.3 kg)   SpO2 94%   BMI 23.62 kg/m²      Physical Exam  Vitals reviewed. Constitutional:       General: She is not in acute distress. Appearance: She is well-developed. She is not diaphoretic. HENT:      Head: Normocephalic and atraumatic. Eyes:      General:         Right eye: No discharge. Left eye: No discharge. Neck:      Trachea: No tracheal deviation. Cardiovascular:      Rate and Rhythm: Normal rate and regular rhythm. Pulmonary:      Effort: No respiratory distress. Breath sounds: No stridor. No wheezing, rhonchi or rales. Chest:      Chest wall: No tenderness. Abdominal:      General: There is no distension. Tenderness: There is no abdominal tenderness. Skin:     General: Skin is warm and dry. Findings: No rash. Neurological:      Mental Status: She is alert and oriented to person, place, and time. Psychiatric:         Behavior: Behavior normal.         DATA:      Radiology Review:  Pertinent images / reports were reviewed as a part of this visit. CXR done March 2021:  Heart size and pulmonary vasculature within normal limits. Lungs clear. Costophrenic angles sharp. Suture anchors in the proximal right humerus     Last PFTs done March 2021:  Spirometry reveals decreased FVC at 1.8 liters which is 67% predicted. FEV1 is decreased at 1.02 liters which is 49% predicted. FEV1/FVC ratio  is reduced at 57%. Expiratory flow rates are reduced. There is  significant improvement after inhaled bronchodilators. Lung volumes  reveal normal total lung capacity, vital capacity and residual volume. Diffusion capacity is normal.     IMPRESSION:  Severe obstructive lung disease with a good response to  inhaled bronchodilators. Assessment / Plan:   1.  Chronic respiratory failure with hypoxia ([address]  - Good saturations in the office today  - Continue nighttime O2    2. COPD, severe ([address]  - Stable MORENO  - Cough worse in the AM  - Targeted HHN when cough worse  - Continue Advair / Spiriva  - Flu shot in the Fall   - Doesn't yet met criteria for LDCT  - Last imaging without suspicious nodules     Return in about 6 months (around 10/14/2022). RTC sooner if symptoms worsen.   Maria M Hilton MSN APRN-ACNP CCRN

## 2022-10-13 ENCOUNTER — HOSPITAL ENCOUNTER (OUTPATIENT)
Dept: GENERAL RADIOLOGY | Age: 57
Discharge: HOME | End: 2022-10-13
Payer: MEDICARE

## 2022-10-13 ENCOUNTER — HOSPITAL ENCOUNTER (OUTPATIENT)
Age: 57
Discharge: HOME | End: 2022-10-13
Payer: MEDICARE

## 2022-10-13 ENCOUNTER — OFFICE VISIT (OUTPATIENT)
Dept: PULMONOLOGY | Age: 57
End: 2022-10-13
Payer: MEDICARE

## 2022-10-13 VITALS
HEART RATE: 75 BPM | OXYGEN SATURATION: 96 % | BODY MASS INDEX: 24.77 KG/M2 | HEIGHT: 58 IN | WEIGHT: 118 LBS | DIASTOLIC BLOOD PRESSURE: 60 MMHG | SYSTOLIC BLOOD PRESSURE: 100 MMHG

## 2022-10-13 DIAGNOSIS — J96.11 CHRONIC RESPIRATORY FAILURE WITH HYPOXIA (HCC): ICD-10-CM

## 2022-10-13 DIAGNOSIS — J20.9 ACUTE BRONCHITIS, UNSPECIFIED ORGANISM: ICD-10-CM

## 2022-10-13 DIAGNOSIS — J20.9 ACUTE BRONCHITIS, UNSPECIFIED ORGANISM: Primary | ICD-10-CM

## 2022-10-13 DIAGNOSIS — J44.9 COPD, SEVERE (HCC): ICD-10-CM

## 2022-10-13 PROCEDURE — G8427 DOCREV CUR MEDS BY ELIG CLIN: HCPCS | Performed by: NURSE PRACTITIONER

## 2022-10-13 PROCEDURE — 99214 OFFICE O/P EST MOD 30 MIN: CPT | Performed by: NURSE PRACTITIONER

## 2022-10-13 PROCEDURE — G8484 FLU IMMUNIZE NO ADMIN: HCPCS | Performed by: NURSE PRACTITIONER

## 2022-10-13 PROCEDURE — 71046 X-RAY EXAM CHEST 2 VIEWS: CPT

## 2022-10-13 PROCEDURE — 3023F SPIROM DOC REV: CPT | Performed by: NURSE PRACTITIONER

## 2022-10-13 PROCEDURE — G8420 CALC BMI NORM PARAMETERS: HCPCS | Performed by: NURSE PRACTITIONER

## 2022-10-13 PROCEDURE — 3017F COLORECTAL CA SCREEN DOC REV: CPT | Performed by: NURSE PRACTITIONER

## 2022-10-13 PROCEDURE — 1036F TOBACCO NON-USER: CPT | Performed by: NURSE PRACTITIONER

## 2022-10-13 RX ORDER — DOXYCYCLINE HYCLATE 100 MG
100 TABLET ORAL 2 TIMES DAILY
Qty: 14 TABLET | Refills: 0 | Status: SHIPPED | OUTPATIENT
Start: 2022-10-13 | End: 2022-10-20

## 2022-10-13 ASSESSMENT — ENCOUNTER SYMPTOMS
COUGH: 1
CONSTIPATION: 0
SHORTNESS OF BREATH: 1
ABDOMINAL PAIN: 0
COLOR CHANGE: 0

## 2022-10-13 NOTE — PROGRESS NOTES
[facility] Follow Up Note    Subjective:   CHIEF COMPLAINT / HPI: Severe COPD   The patient is 61 y.o. female who presents today for a routine follow up visit related to the above mentioned issues. There is a PMH significant for other conditions including CAD, asthma and colon cancer. She was last evaluated by me in April. At that time she reported her breathing had been doing well. Presently she reports she has experienced worsening cough over the last 4 weeks. She is bringing up mucous which is colored. This is not typical for her. She denies fevers or chills but is having to use her rescue TYSON more than normal.    She is compliant with Advair, Spiriva, TYSON. She has been using TYSON 3-4 times daily with worsened symptoms. She typically uses her nebulizer 1 x per week. This helps her when phlegm is worse. She also wears O2 at night. She has had both COVID vaccinations but doesn't not want to pursue third dose d/t concerns over side effects.         Past Medical History:   Diagnosis Date    Asthma     CAD (coronary artery disease) 2012    [facility] ([facility] [address]     dx     COPD (chronic obstructive pulmonary disease) ([facility] [address]     Emphysema     Essential hypertension 2017    GERD (gastroesophageal reflux disease)     Heart attack ([facility] [address]     Other and unspecified hyperlipidemia 2012    Pneumonia      Social History:    Social History     Tobacco Use   Smoking Status Former    Years: 35.00    Types: Cigarettes    Quit date: 2018    Years since quittin.1   Smokeless Tobacco Never   Tobacco Comments    H.O.smoking 1.5 ppd x 35 yrs / Quit 2009     Current Medications:     Current Outpatient Medications on File Prior to Visit   Medication Sig Dispense Refill    isosorbide mononitrate (IMDUR) 60 MG extended release tablet Take 60 mg by mouth daily      topiramate (TOPAMAX) 50 MG tablet Take 100 mg by mouth at bedtime      fluticasone-salmeterol (ADVAIR DISKUS) 250-50 MCG/DOSE AEPB Inhale 1 puff into the lungs every 12 hours 1 each 5    tiotropium (SPIRIVA RESPIMAT) 2.5 MCG/ACT AERS inhaler Inhale 2 puffs into the lungs daily 1 each 3    albuterol sulfate  (90 Base) MCG/ACT inhaler Inhale 2 puffs into the lungs every 6 hours as needed for Wheezing 1 each 5    albuterol (PROVENTIL) (2.5 MG/3ML) 0.083% nebulizer solution Take 3 mLs by nebulization every 6 hours as needed for Wheezing DX:COPD J44.9 120 each 11    sertraline (ZOLOFT) 100 MG tablet Take 100 mg by mouth daily      amLODIPine (NORVASC) 5 MG tablet Take 10 mg by mouth daily       albuterol sulfate HFA (VENTOLIN HFA) 108 (90 Base) MCG/ACT inhaler Inhale 2 puffs into the lungs every 6 hours as needed for Wheezing 1 Inhaler 11    metoprolol tartrate (LOPRESSOR) 25 MG tablet 1 TAB BY MOUTH TWICE DAILY 60 tablet 11    atorvastatin (LIPITOR) 40 MG tablet 1 TAB BY MOUTH DAILY 30 tablet 3    clopidogrel (PLAVIX) 75 MG tablet 1 TAB BY MOUTH DAILY 30 tablet 11    nitroGLYCERIN (NITROSTAT) 0.4 MG SL tablet Place 1 tablet under the tongue every 5 minutes as needed for Chest pain 25 tablet 1    albuterol (PROVENTIL) (2.5 MG/3ML) 0.083% nebulizer solution Take 3 mLs by nebulization every 6 hours as needed for Wheezing DX:COPD J44.9 120 each 6    aspirin 81 MG chewable tablet Take 1 tablet by mouth daily 30 tablet 11    busPIRone (BUSPAR) 5 MG tablet Take 15 mg by mouth 3 times daily        No current facility-administered medications on file prior to visit. Review of Systems   Constitutional:  Negative for chills and fever. HENT:  Negative for congestion and postnasal drip. Respiratory:  Positive for cough and shortness of breath. Cardiovascular:  Negative for chest pain and leg swelling. Gastrointestinal:  Negative for abdominal pain and constipation. Musculoskeletal:  Negative for arthralgias and joint swelling. Skin:  Negative for color change and pallor.    Allergic/Immunologic: Negative for environmental allergies and food allergies. Psychiatric/Behavioral:  Negative for agitation and confusion. Objective:       VITALS:  /60 (Site: Left Upper Arm, Position: Sitting, Cuff Size: Small Adult)   Pulse 75   Ht 4' 10\" (1.473 m)   Wt 118 lb (53.5 kg)   SpO2 96%   BMI 24.66 kg/m²      Physical Exam  Vitals reviewed. Constitutional:       General: She is not in acute distress. Appearance: She is well-developed. She is not diaphoretic. HENT:      Head: Normocephalic and atraumatic. Eyes:      General:         Right eye: No discharge. Left eye: No discharge. Neck:      Trachea: No tracheal deviation. Cardiovascular:      Rate and Rhythm: Normal rate and regular rhythm. Pulmonary:      Effort: No respiratory distress. Breath sounds: No stridor. Rales present. No wheezing or rhonchi. Chest:      Chest wall: No tenderness. Abdominal:      General: There is no distension. Tenderness: There is no abdominal tenderness. Skin:     General: Skin is warm and dry. Findings: No rash. Neurological:      Mental Status: She is alert and oriented to person, place, and time. Psychiatric:         Behavior: Behavior normal.       DATA:      Radiology Review:  Pertinent images / reports were reviewed as a part of this visit. CXR done March 2021:  Heart size and pulmonary vasculature within normal limits. Lungs clear. Costophrenic angles sharp. Suture anchors in the proximal right humerus     Last PFTs done March 2021:  Spirometry reveals decreased FVC at 1.8 liters which is 67% predicted. FEV1 is decreased at 1.02 liters which is 49% predicted. FEV1/FVC ratio  is reduced at 57%. Expiratory flow rates are reduced. There is  significant improvement after inhaled bronchodilators. Lung volumes  reveal normal total lung capacity, vital capacity and residual volume.    Diffusion capacity is normal.     IMPRESSION:  Severe obstructive lung disease with a good response to  inhaled bronchodilators. Assessment / Plan:   1. Chronic respiratory failure with hypoxia (HCC)  - Good oxygen saturations on RA at rest  - Benefits from O2 at night    2. COPD, severe ([address]  - Some increased MORENO with increased cough  - Continue Advair, Spiriva  - Increase HHN to BID with rescue TYSON in between for a total of QID treatments  - Recommend flu shot and COVID booster when she is feeling better    3. Acute bronchitis, unspecified organism  - She does have some rales on exam but no overt signs of volume overload   - She does feel like she might be coming down with something  - Doxycycline   - XR CHEST STANDARD (2 VW); Future     Return in about 4 weeks (around 11/10/2022). RTC sooner if symptoms worsen.   Nicole Mckoy MSN APRN-ACNP CCRN

## 2022-10-19 ENCOUNTER — TELEPHONE (OUTPATIENT)
Dept: PULMONOLOGY | Age: 57
End: 2022-10-19

## 2022-10-19 NOTE — TELEPHONE ENCOUNTER
Pt called and needs refills on:    albuterol sulfate  (90 Base) MCG/ACT inhaler     albuterol (PROVENTIL) (2.5 MG/3ML) 0.083% nebulizer solution     fluticasone-salmeterol (ADVAIR DISKUS) 250-50 MCG/DOSE AEPB     tiotropium (SPIRIVA RESPIMAT) 2.5 MCG/ACT AERS inhaler       [facility] [id number]   Phone:  [phone]  Fax:  [phone]

## 2022-10-20 RX ORDER — ALBUTEROL SULFATE 90 UG/1
2 AEROSOL, METERED RESPIRATORY (INHALATION) EVERY 6 HOURS PRN
Qty: 54 G | Refills: 3 | Status: SHIPPED | OUTPATIENT
Start: 2022-10-20 | End: 2023-10-20

## 2022-10-20 RX ORDER — FLUTICASONE PROPIONATE AND SALMETEROL 250; 50 UG/1; UG/1
1 POWDER RESPIRATORY (INHALATION) EVERY 12 HOURS
Qty: 3 EACH | Refills: 3 | Status: SHIPPED | OUTPATIENT
Start: 2022-10-20

## 2022-10-20 RX ORDER — ALBUTEROL SULFATE 2.5 MG/3ML
2.5 SOLUTION RESPIRATORY (INHALATION) EVERY 6 HOURS PRN
Qty: 1080 ML | Refills: 3 | Status: SHIPPED | OUTPATIENT
Start: 2022-10-20

## 2023-07-24 ENCOUNTER — OFFICE VISIT (OUTPATIENT)
Dept: PULMONOLOGY | Age: 58
End: 2023-07-24
Payer: MEDICARE

## 2023-07-24 VITALS — OXYGEN SATURATION: 98 % | HEART RATE: 81 BPM

## 2023-07-24 DIAGNOSIS — R91.1 PULMONARY NODULE: ICD-10-CM

## 2023-07-24 DIAGNOSIS — Z87.891 FORMER SMOKER: ICD-10-CM

## 2023-07-24 DIAGNOSIS — J43.2 CENTRILOBULAR EMPHYSEMA (HCC): ICD-10-CM

## 2023-07-24 DIAGNOSIS — J44.9 COPD, SEVERE (HCC): Primary | ICD-10-CM

## 2023-07-24 PROCEDURE — 99214 OFFICE O/P EST MOD 30 MIN: CPT | Performed by: INTERNAL MEDICINE

## 2023-07-24 PROCEDURE — G8427 DOCREV CUR MEDS BY ELIG CLIN: HCPCS | Performed by: INTERNAL MEDICINE

## 2023-07-24 PROCEDURE — 1036F TOBACCO NON-USER: CPT | Performed by: INTERNAL MEDICINE

## 2023-07-24 PROCEDURE — G8420 CALC BMI NORM PARAMETERS: HCPCS | Performed by: INTERNAL MEDICINE

## 2023-07-24 PROCEDURE — 3023F SPIROM DOC REV: CPT | Performed by: INTERNAL MEDICINE

## 2023-07-24 PROCEDURE — 3017F COLORECTAL CA SCREEN DOC REV: CPT | Performed by: INTERNAL MEDICINE

## 2023-07-24 RX ORDER — ALBUTEROL SULFATE 90 UG/1
2 AEROSOL, METERED RESPIRATORY (INHALATION) EVERY 6 HOURS PRN
Qty: 18 G | Refills: 11 | Status: SHIPPED | OUTPATIENT
Start: 2023-07-24 | End: 2024-07-23

## 2023-07-24 RX ORDER — FLUTICASONE PROPIONATE AND SALMETEROL 250; 50 UG/1; UG/1
1 POWDER RESPIRATORY (INHALATION) EVERY 12 HOURS
Qty: 1 EACH | Refills: 6 | Status: SHIPPED | OUTPATIENT
Start: 2023-07-24

## 2023-07-24 NOTE — PROGRESS NOTES
[facility]  Follow Up Note  Lissy Mack MD       Nic Rapp   YOB: 1965    Date of Visit:  7/24/2023    Assessment/Plan:  1. COPD, severe ([address])  Stable  No recent PFTs    Medication management:  Advair  Spiriva  Albuterol    2. Centrilobular emphysema ([address])  I reviewed CT imaging and my impression is apical predominant centrilobular emphysema which is at least moderate in severity. 3. Pulmonary nodule  Reviewed CT imaging and my impression is mild tree-in-bud infiltrate in both right and left upper lobe. This is more likely to be inflammatory/infectious that malignant  Plan repeat CT scan in 1 year    4. Former smoker  Recommend annual CT imaging      Chief Complaint   Patient presents with    Shortness of Breath     CT done at [facility] 7-19-23 Had Pushed thru PAX       HPI  The patient presents with a chief complaint of moderate shortness of breath related to severe COPD of many years duration. He has mild associated cough. Exertion is a modifying factor. She also has changes of centrilobular emphysema on CT imaging. She has been stable. She takes ADvair and Spiriva. She is a former smoker. Recent CT showd tree-in-bud infiltrate. Review of Systems  As reviewed in HPI    History  I have reviewed past medical, surgical, social and family history. This is documented elsewhere in the medical record. Physical Exam:  Well developed, well nourished  Alert and oriented  Sclera is clear  No cervical adenopathy  No JVD. Chest examination is clear. Cardiac examination reveals regular rate and rhythm without murmur, gallop or rub. The abdomen is soft, nontender and nondistended. There is no clubbing, cyanosis or edema of the extremities. There is no obvious skin rash.   No focal neuro deficicts  Normal mood and affect    Allergies   Allergen Reactions    Lisinopril Other (See Comments)     Cough      Phenergan [Promethazine Hcl] Other (See

## 2024-01-26 ENCOUNTER — OFFICE VISIT (OUTPATIENT)
Dept: PULMONOLOGY | Age: 59
End: 2024-01-26

## 2024-01-26 VITALS
SYSTOLIC BLOOD PRESSURE: 128 MMHG | HEART RATE: 88 BPM | HEIGHT: 58 IN | BODY MASS INDEX: 23.09 KG/M2 | DIASTOLIC BLOOD PRESSURE: 62 MMHG | WEIGHT: 110 LBS | OXYGEN SATURATION: 97 %

## 2024-01-26 DIAGNOSIS — J96.11 CHRONIC RESPIRATORY FAILURE WITH HYPOXIA (HCC): ICD-10-CM

## 2024-01-26 DIAGNOSIS — J44.9 COPD, SEVERE (HCC): Primary | ICD-10-CM

## 2024-01-26 DIAGNOSIS — F17.200 CURRENT SMOKER: ICD-10-CM

## 2024-01-26 DIAGNOSIS — Z87.891 HISTORY OF TOBACCO ABUSE: Primary | ICD-10-CM

## 2024-01-26 DIAGNOSIS — J43.2 CENTRILOBULAR EMPHYSEMA (HCC): ICD-10-CM

## 2024-01-26 DIAGNOSIS — R91.1 PULMONARY NODULE: ICD-10-CM

## 2024-01-26 RX ORDER — PANTOPRAZOLE SODIUM 40 MG/1
40 TABLET, DELAYED RELEASE ORAL DAILY
COMMUNITY

## 2024-01-26 RX ORDER — OXYCODONE HYDROCHLORIDE AND ACETAMINOPHEN 5; 325 MG/1; MG/1
1 TABLET ORAL PRN
COMMUNITY
Start: 2023-12-21

## 2024-01-26 NOTE — PROGRESS NOTES
chewable tablet Take 1 tablet by mouth daily Yes Liliana Cooley MD   pantoprazole (PROTONIX) 40 MG tablet Take 1 tablet by mouth daily  Patient not taking: Reported on 1/26/2024  ProviderMaría MD   fluticasone-salmeterol (ADVAIR DISKUS) 250-50 MCG/ACT AEPB diskus inhaler Inhale 1 puff into the lungs in the morning and 1 puff in the evening.  Patient not taking: Reported on 1/26/2024  Lonny Morse MD   tiotropium (SPIRIVA RESPIMAT) 2.5 MCG/ACT AERS inhaler Inhale 2 puffs into the lungs daily  Patient not taking: Reported on 1/26/2024  Lonny Morse MD   albuterol sulfate HFA (PROVENTIL;VENTOLIN;PROAIR) 108 (90 Base) MCG/ACT inhaler Inhale 2 puffs into the lungs every 6 hours as needed for Wheezing  Patient not taking: Reported on 1/26/2024  Lonny Morse MD   albuterol (PROVENTIL) (2.5 MG/3ML) 0.083% nebulizer solution Take 3 mLs by nebulization every 6 hours as needed for Wheezing DX:COPD J44.9  Patient not taking: Reported on 1/26/2024  Kate Tate APRN - CNP   albuterol sulfate  (90 Base) MCG/ACT inhaler Inhale 2 puffs into the lungs every 6 hours as needed for Wheezing  Patient not taking: Reported on 1/26/2024  Kate Tate APRN - CNP   albuterol sulfate HFA (VENTOLIN HFA) 108 (90 Base) MCG/ACT inhaler Inhale 2 puffs into the lungs every 6 hours as needed for Wheezing  Patient not taking: Reported on 1/26/2024  Lonny Morse MD   albuterol (PROVENTIL) (2.5 MG/3ML) 0.083% nebulizer solution Take 3 mLs by nebulization every 6 hours as needed for Wheezing DX:COPD J44.9  Patient not taking: Reported on 1/26/2024  Lonny Morse MD   busPIRone (BUSPAR) 5 MG tablet Take 15 mg by mouth 3 times daily   ProviderMaría MD       Vitals:    01/26/24 0947   BP: 128/62   Site: Left Upper Arm   Position: Sitting   Cuff Size: Medium Adult   Pulse: 88   SpO2: 97%   Weight: 49.9 kg (110 lb)   Height: 1.473 m (4' 10\")     Body mass index is 22.99

## 2024-02-21 ENCOUNTER — TELEPHONE (OUTPATIENT)
Dept: PULMONOLOGY | Age: 59
End: 2024-02-21

## 2024-02-21 DIAGNOSIS — J43.2 CENTRILOBULAR EMPHYSEMA (HCC): ICD-10-CM

## 2024-02-21 DIAGNOSIS — J96.11 CHRONIC RESPIRATORY FAILURE WITH HYPOXIA (HCC): ICD-10-CM

## 2024-02-21 DIAGNOSIS — J44.9 COPD, SEVERE (HCC): Primary | ICD-10-CM

## 2024-02-21 NOTE — TELEPHONE ENCOUNTER
Is it ok to place referral for this Dr. Justin Sevilla.    Should we bring patient in for OV to discuss.    Last OV-1/26/2024

## 2024-02-21 NOTE — TELEPHONE ENCOUNTER
Referral from Dr Morse for [place] valves.   Patient had last PFT in 2021.    Called & spoke to patient and she states her referral was to be sent to a Pulmonary office in [place]. Non [facility]     She will call Dr Morse office to inform staff that she does not want Valve evaluation at our office.     
No

## 2024-02-21 NOTE — TELEPHONE ENCOUNTER
Patient called and would like a referral be sent to a  who does [facility]    Fax: [phone]    PH: [phone]

## 2024-04-16 ENCOUNTER — OFFICE VISIT (OUTPATIENT)
Dept: PULMONOLOGY | Age: 59
End: 2024-04-16
Payer: MEDICARE

## 2024-04-16 ENCOUNTER — TELEPHONE (OUTPATIENT)
Dept: PULMONOLOGY | Age: 59
End: 2024-04-16

## 2024-04-16 VITALS
HEIGHT: 58 IN | SYSTOLIC BLOOD PRESSURE: 124 MMHG | BODY MASS INDEX: 23.13 KG/M2 | DIASTOLIC BLOOD PRESSURE: 84 MMHG | WEIGHT: 110.2 LBS | HEART RATE: 89 BPM | OXYGEN SATURATION: 96 %

## 2024-04-16 DIAGNOSIS — R91.1 PULMONARY NODULE: ICD-10-CM

## 2024-04-16 DIAGNOSIS — J43.2 CENTRILOBULAR EMPHYSEMA (HCC): ICD-10-CM

## 2024-04-16 DIAGNOSIS — Z87.891 FORMER SMOKER: ICD-10-CM

## 2024-04-16 DIAGNOSIS — J44.9 COPD, SEVERE (HCC): Primary | ICD-10-CM

## 2024-04-16 DIAGNOSIS — J96.11 CHRONIC RESPIRATORY FAILURE WITH HYPOXIA (HCC): ICD-10-CM

## 2024-04-16 PROCEDURE — G8427 DOCREV CUR MEDS BY ELIG CLIN: HCPCS | Performed by: INTERNAL MEDICINE

## 2024-04-16 PROCEDURE — 1036F TOBACCO NON-USER: CPT | Performed by: INTERNAL MEDICINE

## 2024-04-16 PROCEDURE — 3017F COLORECTAL CA SCREEN DOC REV: CPT | Performed by: INTERNAL MEDICINE

## 2024-04-16 PROCEDURE — 99214 OFFICE O/P EST MOD 30 MIN: CPT | Performed by: INTERNAL MEDICINE

## 2024-04-16 PROCEDURE — G8420 CALC BMI NORM PARAMETERS: HCPCS | Performed by: INTERNAL MEDICINE

## 2024-04-16 PROCEDURE — 3023F SPIROM DOC REV: CPT | Performed by: INTERNAL MEDICINE

## 2024-04-16 PROCEDURE — 3074F SYST BP LT 130 MM HG: CPT | Performed by: INTERNAL MEDICINE

## 2024-04-16 PROCEDURE — 3079F DIAST BP 80-89 MM HG: CPT | Performed by: INTERNAL MEDICINE

## 2024-04-16 RX ORDER — METOPROLOL SUCCINATE 25 MG/1
25 TABLET, EXTENDED RELEASE ORAL DAILY
COMMUNITY
Start: 2024-03-26

## 2024-04-16 RX ORDER — FLUTICASONE FUROATE, UMECLIDINIUM BROMIDE AND VILANTEROL TRIFENATATE 200; 62.5; 25 UG/1; UG/1; UG/1
1 POWDER RESPIRATORY (INHALATION) DAILY
Qty: 1 EACH | Refills: 5 | Status: SHIPPED | OUTPATIENT
Start: 2024-04-16

## 2024-04-16 RX ORDER — MIRTAZAPINE 15 MG/1
15 TABLET, FILM COATED ORAL NIGHTLY
COMMUNITY
Start: 2024-03-27

## 2024-04-16 RX ORDER — BENZONATATE 100 MG/1
100 CAPSULE ORAL 3 TIMES DAILY PRN
Qty: 90 CAPSULE | Refills: 3 | Status: SHIPPED | OUTPATIENT
Start: 2024-04-16 | End: 2024-08-14

## 2024-04-16 NOTE — PROGRESS NOTES
[facility]  Follow Up Note  Lonny Morse MD       Lian Erickson   YOB: 1965    Date of Visit:  4/16/2024    Assessment/Plan:  1. COPD, severe (HCC)  Worse  No recent PFTs    Medication management:  Advair & Spiriva ==> Trelegy 200  Also discussed HHN Pulmicort and Brovana but she prefers to try the Trelegy first.  Albuterol    2. Centrilobular emphysema (HCC)  She request evaluation for [place] valve  She was evaluated at [facility] but found not to be suitable candidate for valve placement.    3. Pulmonary nodule  Reviewed CT imaging and my impression is mild tree-in-bud infiltrate in both right and left upper lobe.  This is more likely to be inflammatory/infectious that malignant  Plan repeat CT scan in July 2024.    4. Former smoker  CT Chest 7/23:  Impression    Tree-in-bud opacities of the right upper lobe, which can be seen with infectious/inflammatory etiologies.    ASSESSMENT: Lung RADS Category 2    MODIFIER: S coronary artery disease    RECOMMENDATION: Routine screening in 1 year.    Chief Complaint   Patient presents with    COPD    Cough    Shortness of Breath    Wheezing       HPI  The patient presents with a chief complaint of moderate shortness of breath related to severe COPD of many years duration. He has mild associated cough. Exertion is a modifying factor. She also has changes of centrilobular emphysema on CT imaging. She has been stable. She takes ADvair and Spiriva. She is a former smoker.    The patient had called our office wanting an evaluation for [place] valve placement.  I sent a referral to [facility] but she actually ended up being evaluated at [facility].  However, result of that evaluation was that she was not appropriate for [place] valve.      Review of Systems  As reviewed in HPI    History  I have reviewed past medical, surgical, social and family history. This is documented elsewhere in the medical

## 2024-04-16 NOTE — TELEPHONE ENCOUNTER
Patient was in for a visit today and wanted to know if  would call something in for her cough     [facility] - [place] [address] - P [phone] - F [phone]      PH: [phone]

## 2024-04-19 DIAGNOSIS — Z87.891 FORMER SMOKER: Primary | ICD-10-CM

## 2024-06-07 ENCOUNTER — TELEPHONE (OUTPATIENT)
Dept: PULMONOLOGY | Age: 59
End: 2024-06-07

## 2024-06-07 RX ORDER — ALBUTEROL SULFATE 90 UG/1
2 AEROSOL, METERED RESPIRATORY (INHALATION) EVERY 6 HOURS PRN
Qty: 54 G | Refills: 2 | Status: SHIPPED | OUTPATIENT
Start: 2024-06-07

## 2024-06-07 NOTE — TELEPHONE ENCOUNTER
Pt called asking for refills on:    albuterol sulfate HFA (VENTOLIN HFA) 108 (90 Base) MCG/ACT inhaler     Send to:    [facility] -   [facility] [address],   [facility] [address]  Phone: [phone]    Fax: [phone]

## 2024-07-26 ENCOUNTER — TELEPHONE (OUTPATIENT)
Dept: PULMONOLOGY | Age: 59
End: 2024-07-26

## 2024-07-26 NOTE — TELEPHONE ENCOUNTER
Patient called stating that she had her CT lung screen at [facility].  She would like Dr. Morse to review the results. Spoke with Kalli at [facility] to have images pushed into PACS.

## 2024-07-29 NOTE — TELEPHONE ENCOUNTER
Lonny Morse MD    7/29/24  9:21 AM  I reviewed images.  CT shows emphysema and a small pleural based nodule on the right. LNs are similar as well  Findings are similar to previous  Recommend repeat CT in one year

## 2024-10-16 ENCOUNTER — OFFICE VISIT (OUTPATIENT)
Dept: PULMONOLOGY | Age: 59
End: 2024-10-16

## 2024-10-16 VITALS
DIASTOLIC BLOOD PRESSURE: 62 MMHG | OXYGEN SATURATION: 96 % | HEIGHT: 58 IN | SYSTOLIC BLOOD PRESSURE: 118 MMHG | BODY MASS INDEX: 24.48 KG/M2 | HEART RATE: 77 BPM | WEIGHT: 116.6 LBS

## 2024-10-16 DIAGNOSIS — J44.9 COPD, SEVERE (HCC): Primary | ICD-10-CM

## 2024-10-16 DIAGNOSIS — J43.2 CENTRILOBULAR EMPHYSEMA (HCC): ICD-10-CM

## 2024-10-16 DIAGNOSIS — Z87.891 FORMER SMOKER: ICD-10-CM

## 2024-10-16 DIAGNOSIS — J96.11 CHRONIC RESPIRATORY FAILURE WITH HYPOXIA: ICD-10-CM

## 2024-10-16 DIAGNOSIS — Z23 FLU VACCINE NEED: ICD-10-CM

## 2024-10-16 NOTE — PROGRESS NOTES
[facility]  Follow Up Note  MD Red Hickeymarcelo Erickson   YOB: 1965    Date of Visit:  10/16/2024    Assessment/Plan:  1. COPD, severe (HCC)  Stable  No recent PFTs    Medication management:  Advair & Spiriva ==> Trelegy 200  Albuterol HHN and HFA    2. Centrilobular emphysema (HCC)  She request evaluation for [place] valve  She was evaluated at [facility] but found not to be suitable candidate for valve placement.    3. Pulmonary nodule  LDCT chest did not show worrisome nodule on imaging 7/24 at [facility].    4. Former smoker  CT Chest 7/24:  CT-CHEST LUNG CANCER SCREENING LDCT WO (INSURED)   Indication: Personal history of nicotine dependence   Comparison: 3/13/2024   Technique:   Low dose CT of the chest without intravenous contrast was   acquired at 2.5 mm section thickness. Thin section and MIP reconstructions   were created in the axial plane, per lung cancer screening protocol.   Coronal and sagittal reconstructions were performed. Up-to-date CT   treatment and radiation dose reduction techniques were employed.   Findings:   There is a background of emphysema.   Lung windows show no focal consolidation.   Pleural spaces are without effusions or pneumothorax.   The heart is not enlarged. No pericardial effusion. There are   atherosclerotic calcifications within the coronary arteries and thoracic   aorta.   Subcarinal lymph node measures 11 mm in short axis, stable. Anterior   subcarinal node measures 12 mm short axis, stable. The trachea and   mainstem bronchi are patent.     IMPRESSION:IMPRESSION:   Emphysema.   No suspicious pulmonary nodularity.   Stable mediastinal lymphadenopathy.   Lung-RADS Assessment Category:  1 - Negative.   No nodules or definitely benign nodules.   Continue annual screening with low dose CT in 12 months.     5. Chronic Hypoxemic Respiratory Failure  Using O2 with sleep but only occasionally  Not using it

## 2025-01-09 DIAGNOSIS — J44.9 COPD, SEVERE (HCC): Primary | ICD-10-CM

## 2025-01-10 RX ORDER — FLUTICASONE FUROATE, UMECLIDINIUM BROMIDE AND VILANTEROL TRIFENATATE 200; 62.5; 25 UG/1; UG/1; UG/1
1 POWDER RESPIRATORY (INHALATION) DAILY
Qty: 60 EACH | Refills: 5 | Status: SHIPPED | OUTPATIENT
Start: 2025-01-10

## 2025-04-16 ENCOUNTER — OFFICE VISIT (OUTPATIENT)
Dept: PULMONOLOGY | Age: 60
End: 2025-04-16
Payer: MEDICARE

## 2025-04-16 VITALS
HEART RATE: 84 BPM | WEIGHT: 122.4 LBS | SYSTOLIC BLOOD PRESSURE: 126 MMHG | BODY MASS INDEX: 25.69 KG/M2 | HEIGHT: 58 IN | DIASTOLIC BLOOD PRESSURE: 72 MMHG | OXYGEN SATURATION: 96 %

## 2025-04-16 DIAGNOSIS — J43.2 CENTRILOBULAR EMPHYSEMA (HCC): ICD-10-CM

## 2025-04-16 DIAGNOSIS — J96.11 CHRONIC RESPIRATORY FAILURE WITH HYPOXIA: ICD-10-CM

## 2025-04-16 DIAGNOSIS — J44.9 COPD, SEVERE (HCC): Primary | ICD-10-CM

## 2025-04-16 DIAGNOSIS — R91.1 PULMONARY NODULE: ICD-10-CM

## 2025-04-16 DIAGNOSIS — Z87.891 FORMER SMOKER: ICD-10-CM

## 2025-04-16 PROCEDURE — 99214 OFFICE O/P EST MOD 30 MIN: CPT | Performed by: INTERNAL MEDICINE

## 2025-04-16 PROCEDURE — 3078F DIAST BP <80 MM HG: CPT | Performed by: INTERNAL MEDICINE

## 2025-04-16 PROCEDURE — 3074F SYST BP LT 130 MM HG: CPT | Performed by: INTERNAL MEDICINE

## 2025-04-16 RX ORDER — ALBUTEROL SULFATE 0.83 MG/ML
2.5 SOLUTION RESPIRATORY (INHALATION) EVERY 6 HOURS PRN
Qty: 120 EACH | Refills: 6 | Status: SHIPPED | OUTPATIENT
Start: 2025-04-16

## 2025-04-16 NOTE — PROGRESS NOTES
6 hours as needed for Wheezing Yes Pasquale Tarango MD   metoprolol succinate (TOPROL XL) 25 MG extended release tablet Take 1 tablet by mouth daily Yes María Franklin MD   mirtazapine (REMERON) 15 MG tablet Take 1 tablet by mouth nightly Yes María Franklin MD   oxyCODONE-acetaminophen (PERCOCET) 5-325 MG per tablet Take 1 tablet by mouth as needed. Yes María Franklin MD   pantoprazole (PROTONIX) 40 MG tablet Take 1 tablet by mouth daily Yes María Franklin MD   isosorbide mononitrate (IMDUR) 60 MG extended release tablet Take 1 tablet by mouth daily Yes María Franklin MD   topiramate (TOPAMAX) 50 MG tablet Take 2 tablets by mouth at bedtime Yes María Franklin MD   sertraline (ZOLOFT) 100 MG tablet Take 1 tablet by mouth daily Yes María Franklin MD   amLODIPine (NORVASC) 5 MG tablet Take 2 tablets by mouth daily Yes María Franklin MD   metoprolol tartrate (LOPRESSOR) 25 MG tablet 1 TAB BY MOUTH TWICE DAILY Yes Liliana Cooley MD   atorvastatin (LIPITOR) 40 MG tablet 1 TAB BY MOUTH DAILY Yes Liliana Cooley MD   clopidogrel (PLAVIX) 75 MG tablet 1 TAB BY MOUTH DAILY Yes Liliana Cooley MD   nitroGLYCERIN (NITROSTAT) 0.4 MG SL tablet Place 1 tablet under the tongue every 5 minutes as needed for Chest pain Yes Liliana Cooley MD   albuterol (PROVENTIL) (2.5 MG/3ML) 0.083% nebulizer solution Take 3 mLs by nebulization every 6 hours as needed for Wheezing DX:COPD J44.9 Yes Lonny Morse MD   aspirin 81 MG chewable tablet Take 1 tablet by mouth daily Yes Liliana Cooley MD   busPIRone (BUSPAR) 5 MG tablet Take 3 tablets by mouth 3 times daily Yes María Franklin MD       Vitals:    04/16/25 0952   BP: 126/72   BP Site: Right Upper Arm   Patient Position: Sitting   BP Cuff Size: Medium Adult   Pulse: 84   SpO2: 96%   Weight: 55.5 kg (122 lb 6.4 oz)   Height: 1.473 m (4' 10\")     Body mass index is 25.58 kg/m².     Wt Readings from Last 3

## 2025-06-13 DIAGNOSIS — J44.9 COPD, SEVERE (HCC): Primary | ICD-10-CM

## 2025-06-13 RX ORDER — ALBUTEROL SULFATE 90 UG/1
2 INHALANT RESPIRATORY (INHALATION) EVERY 6 HOURS PRN
Qty: 8.5 G | Refills: 9 | Status: SHIPPED | OUTPATIENT
Start: 2025-06-13

## 2025-06-13 NOTE — TELEPHONE ENCOUNTER
Last appointment:  Visit date not found    Next appointment:  10/17/2025    Last refill: [unfilled]

## 2025-07-28 ENCOUNTER — HOSPITAL ENCOUNTER (OUTPATIENT)
Dept: CT IMAGING | Age: 60
Discharge: HOME | End: 2025-07-28
Attending: INTERNAL MEDICINE
Payer: MEDICARE

## 2025-07-28 DIAGNOSIS — Z87.891 FORMER SMOKER: ICD-10-CM

## 2025-07-28 DIAGNOSIS — R91.1 PULMONARY NODULE: ICD-10-CM

## 2025-07-28 PROCEDURE — 71250 CT THORAX DX C-: CPT

## 2025-09-05 DIAGNOSIS — J44.9 COPD, SEVERE (HCC): ICD-10-CM

## 2025-09-05 RX ORDER — FLUTICASONE FUROATE, UMECLIDINIUM BROMIDE AND VILANTEROL TRIFENATATE 200; 62.5; 25 UG/1; UG/1; UG/1
1 POWDER RESPIRATORY (INHALATION) DAILY
Qty: 60 EACH | Refills: 9 | Status: SHIPPED | OUTPATIENT
Start: 2025-09-05

## 2025-09-05 RX ORDER — FLUTICASONE FUROATE, UMECLIDINIUM BROMIDE AND VILANTEROL TRIFENATATE 200; 62.5; 25 UG/1; UG/1; UG/1
1 POWDER RESPIRATORY (INHALATION) DAILY
Qty: 60 EACH | Refills: 5 | Status: SHIPPED | OUTPATIENT
Start: 2025-09-05